# Patient Record
Sex: FEMALE | Race: WHITE | NOT HISPANIC OR LATINO | Employment: FULL TIME | URBAN - METROPOLITAN AREA
[De-identification: names, ages, dates, MRNs, and addresses within clinical notes are randomized per-mention and may not be internally consistent; named-entity substitution may affect disease eponyms.]

---

## 2017-10-26 ENCOUNTER — APPOINTMENT (OUTPATIENT)
Dept: WOMENS IMAGING | Facility: HOSPITAL | Age: 52
End: 2017-10-26

## 2017-10-26 PROCEDURE — 77067 SCR MAMMO BI INCL CAD: CPT | Performed by: RADIOLOGY

## 2017-10-26 PROCEDURE — G0202 SCR MAMMO BI INCL CAD: HCPCS | Performed by: RADIOLOGY

## 2017-10-26 PROCEDURE — 77063 BREAST TOMOSYNTHESIS BI: CPT | Performed by: RADIOLOGY

## 2018-11-15 ENCOUNTER — APPOINTMENT (OUTPATIENT)
Dept: WOMENS IMAGING | Facility: HOSPITAL | Age: 53
End: 2018-11-15

## 2018-11-15 PROCEDURE — 77063 BREAST TOMOSYNTHESIS BI: CPT | Performed by: RADIOLOGY

## 2018-11-15 PROCEDURE — 77067 SCR MAMMO BI INCL CAD: CPT | Performed by: RADIOLOGY

## 2019-10-10 ENCOUNTER — APPOINTMENT (OUTPATIENT)
Dept: WOMENS IMAGING | Facility: HOSPITAL | Age: 54
End: 2019-10-10

## 2019-10-10 PROCEDURE — 77067 SCR MAMMO BI INCL CAD: CPT | Performed by: RADIOLOGY

## 2019-10-10 PROCEDURE — 77063 BREAST TOMOSYNTHESIS BI: CPT | Performed by: RADIOLOGY

## 2019-10-25 ENCOUNTER — APPOINTMENT (OUTPATIENT)
Dept: WOMENS IMAGING | Facility: HOSPITAL | Age: 54
End: 2019-10-25

## 2019-10-25 PROCEDURE — G0279 TOMOSYNTHESIS, MAMMO: HCPCS | Performed by: RADIOLOGY

## 2019-10-25 PROCEDURE — 77061 BREAST TOMOSYNTHESIS UNI: CPT | Performed by: RADIOLOGY

## 2019-10-25 PROCEDURE — 76641 ULTRASOUND BREAST COMPLETE: CPT | Performed by: RADIOLOGY

## 2019-10-25 PROCEDURE — 77065 DX MAMMO INCL CAD UNI: CPT | Performed by: RADIOLOGY

## 2019-11-11 ENCOUNTER — APPOINTMENT (OUTPATIENT)
Dept: WOMENS IMAGING | Facility: HOSPITAL | Age: 54
End: 2019-11-11

## 2019-11-11 PROCEDURE — 76642 ULTRASOUND BREAST LIMITED: CPT | Performed by: RADIOLOGY

## 2019-12-06 ENCOUNTER — OFFICE VISIT (OUTPATIENT)
Dept: FAMILY MEDICINE CLINIC | Facility: CLINIC | Age: 54
End: 2019-12-06

## 2019-12-06 VITALS
WEIGHT: 122.2 LBS | OXYGEN SATURATION: 97 % | TEMPERATURE: 97.9 F | SYSTOLIC BLOOD PRESSURE: 120 MMHG | BODY MASS INDEX: 20.36 KG/M2 | HEIGHT: 65 IN | HEART RATE: 86 BPM | RESPIRATION RATE: 18 BRPM | DIASTOLIC BLOOD PRESSURE: 70 MMHG

## 2019-12-06 DIAGNOSIS — E03.9 HYPOTHYROIDISM, UNSPECIFIED TYPE: Primary | ICD-10-CM

## 2019-12-06 DIAGNOSIS — M62.838 MUSCLE SPASMS OF NECK: ICD-10-CM

## 2019-12-06 LAB
BILIRUB BLD-MCNC: NEGATIVE MG/DL
CLARITY, POC: CLEAR
COLOR UR: YELLOW
GLUCOSE UR STRIP-MCNC: NEGATIVE MG/DL
KETONES UR QL: NEGATIVE
LEUKOCYTE EST, POC: NEGATIVE
NITRITE UR-MCNC: NEGATIVE MG/ML
PH UR: 6 [PH] (ref 5–8)
PROT UR STRIP-MCNC: NEGATIVE MG/DL
RBC # UR STRIP: NEGATIVE /UL
SP GR UR: 1 (ref 1–1.03)
UROBILINOGEN UR QL: NORMAL

## 2019-12-06 PROCEDURE — 99212 OFFICE O/P EST SF 10 MIN: CPT | Performed by: FAMILY MEDICINE

## 2019-12-06 PROCEDURE — 81003 URINALYSIS AUTO W/O SCOPE: CPT | Performed by: FAMILY MEDICINE

## 2019-12-06 RX ORDER — LEVOTHYROXINE AND LIOTHYRONINE 76; 18 UG/1; UG/1
120 TABLET ORAL DAILY
COMMUNITY
End: 2019-12-06

## 2019-12-06 RX ORDER — TIZANIDINE 4 MG/1
4 TABLET ORAL NIGHTLY
Qty: 30 TABLET | Refills: 3 | Status: SHIPPED | OUTPATIENT
Start: 2019-12-06 | End: 2020-07-13 | Stop reason: SDUPTHER

## 2019-12-06 RX ORDER — TIZANIDINE 4 MG/1
1 TABLET ORAL NIGHTLY
COMMUNITY
Start: 2019-11-03 | End: 2019-12-06 | Stop reason: SDUPTHER

## 2019-12-06 RX ORDER — LEVOCETIRIZINE DIHYDROCHLORIDE 5 MG/1
1 TABLET, FILM COATED ORAL DAILY
COMMUNITY
Start: 2019-11-03 | End: 2021-02-12 | Stop reason: SDUPTHER

## 2019-12-06 RX ORDER — LEVOTHYROXINE AND LIOTHYRONINE 38; 9 UG/1; UG/1
60 TABLET ORAL DAILY
COMMUNITY
End: 2020-06-17

## 2019-12-06 RX ORDER — FEXOFENADINE HCL AND PSEUDOEPHEDRINE HCI 180; 240 MG/1; MG/1
1 TABLET, EXTENDED RELEASE ORAL DAILY
COMMUNITY
End: 2020-07-13

## 2019-12-06 RX ORDER — MONTELUKAST SODIUM 10 MG/1
10 TABLET ORAL DAILY
COMMUNITY
End: 2021-02-12 | Stop reason: SDUPTHER

## 2019-12-06 NOTE — PROGRESS NOTES
Coty Garcia is a 54 y.o. female.     Chief Complaint   Patient presents with   • Thyroid Problem     follow up       Patient is doing great, she is due for labs today, she had an abnormal mammogram done recently by her GYN doctor, but the biopsy was normal, she is due to repeat the mammogram with ultrasound in 6 months by her GYN specialist, she needs refill her thyroid medication today, she is not fasting, she denies any chest pain or shortness of breath, she feels great, and declined an influenza immunization           The following portions of the patient's history were reviewed and updated as appropriate: allergies, current medications, past family history, past medical history, past social history, past surgical history and problem list.    Past Medical History:   Diagnosis Date   • Abdominal pain    • Abnormal blood chemistry    • Abnormal Pap smear of cervix    • Acute maxillary sinusitis    • Allergic rhinitis    • Chronic constipation    • Chronic obstructive pulmonary disease (CMS/HCC)    • Constipation    • Fatigue    • Hematuria    • Hypothyroidism    • IBS (irritable bowel syndrome)    • Increased abdominal girth    • Migraine with aura, intractable    • Neck pain    • Perimenopause    • Vitamin D deficiency        History reviewed. No pertinent surgical history.    Family History   Problem Relation Age of Onset   • Diabetes Mother    • Heart disease Mother    • Hypertension Mother    • Cancer Father    • Diabetes Sister    • Diabetes Sister    • Kidney disease Sister        Social History     Socioeconomic History   • Marital status: Unknown     Spouse name: Not on file   • Number of children: Not on file   • Years of education: Not on file   • Highest education level: Not on file   Tobacco Use   • Smoking status: Current Every Day Smoker     Packs/day: 0.50     Types: Cigarettes   Substance and Sexual Activity   • Alcohol use: No     Frequency: Never   • Drug use: No   • Sexual  activity: Yes     Partners: Male     Birth control/protection: None       Review of Systems   Constitutional: Negative.  Negative for fatigue.   HENT: Negative.    Eyes: Negative for blurred vision.   Respiratory: Negative.  Negative for cough, chest tightness and shortness of breath.    Cardiovascular: Negative.  Negative for chest pain, palpitations and leg swelling.   Gastrointestinal: Negative.    Genitourinary: Negative.    Musculoskeletal: Negative.    Skin: Negative.    Neurological: Negative.  Negative for dizziness and light-headedness.   Hematological: Negative.    All other systems reviewed and are negative.      Objective   Vitals:    12/06/19 0905   BP: 120/70   Pulse: 86   Resp: 18   Temp: 97.9 °F (36.6 °C)   SpO2: 97%     Body mass index is 20.58 kg/m².  Physical Exam   Constitutional: She appears well-developed and well-nourished.   HENT:   Head: Normocephalic and atraumatic.   Right Ear: External ear normal.   Left Ear: External ear normal.   Mouth/Throat: Oropharynx is clear and moist.   Cardiovascular: Normal rate, regular rhythm and normal heart sounds. Exam reveals no gallop and no friction rub.   No murmur heard.  Pulmonary/Chest: Effort normal and breath sounds normal. No stridor. No respiratory distress. She has no wheezes. She has no rales.   Musculoskeletal: Normal range of motion.   Neurological: She is alert.   Skin: Skin is warm.   Vitals reviewed.        Assessment/Plan   Lyndsay was seen today for thyroid problem.    Diagnoses and all orders for this visit:    Hypothyroidism, unspecified type  -     CBC & Differential  -     Comprehensive Metabolic Panel  -     POC Urinalysis Dipstick, Automated; Future  -     TSH  -     T3, Free  -     T4  -     T4, free    Muscle spasms of neck  -     tiZANidine (ZANAFLEX) 4 MG tablet; Take 1 tablet by mouth Every Night.

## 2019-12-07 LAB
ALBUMIN SERPL-MCNC: 4.9 G/DL (ref 3.5–5.2)
ALBUMIN/GLOB SERPL: 1.7 G/DL
ALP SERPL-CCNC: 109 U/L (ref 39–117)
ALT SERPL-CCNC: 23 U/L (ref 1–33)
AST SERPL-CCNC: 21 U/L (ref 1–32)
BASOPHILS # BLD AUTO: 0.03 10*3/MM3 (ref 0–0.2)
BASOPHILS NFR BLD AUTO: 0.5 % (ref 0–1.5)
BILIRUB SERPL-MCNC: 0.3 MG/DL (ref 0.2–1.2)
BUN SERPL-MCNC: 10 MG/DL (ref 6–20)
BUN/CREAT SERPL: 13.9 (ref 7–25)
CALCIUM SERPL-MCNC: 10.1 MG/DL (ref 8.6–10.5)
CHLORIDE SERPL-SCNC: 102 MMOL/L (ref 98–107)
CO2 SERPL-SCNC: 32.4 MMOL/L (ref 22–29)
CREAT SERPL-MCNC: 0.72 MG/DL (ref 0.57–1)
EOSINOPHIL # BLD AUTO: 0.02 10*3/MM3 (ref 0–0.4)
EOSINOPHIL NFR BLD AUTO: 0.3 % (ref 0.3–6.2)
ERYTHROCYTE [DISTWIDTH] IN BLOOD BY AUTOMATED COUNT: 12.7 % (ref 12.3–15.4)
GLOBULIN SER CALC-MCNC: 2.9 GM/DL
GLUCOSE SERPL-MCNC: 91 MG/DL (ref 65–99)
HCT VFR BLD AUTO: 44.6 % (ref 34–46.6)
HGB BLD-MCNC: 15 G/DL (ref 12–15.9)
IMM GRANULOCYTES # BLD AUTO: 0.01 10*3/MM3 (ref 0–0.05)
IMM GRANULOCYTES NFR BLD AUTO: 0.2 % (ref 0–0.5)
LYMPHOCYTES # BLD AUTO: 1.8 10*3/MM3 (ref 0.7–3.1)
LYMPHOCYTES NFR BLD AUTO: 30.2 % (ref 19.6–45.3)
MCH RBC QN AUTO: 29.5 PG (ref 26.6–33)
MCHC RBC AUTO-ENTMCNC: 33.6 G/DL (ref 31.5–35.7)
MCV RBC AUTO: 87.6 FL (ref 79–97)
MONOCYTES # BLD AUTO: 0.67 10*3/MM3 (ref 0.1–0.9)
MONOCYTES NFR BLD AUTO: 11.2 % (ref 5–12)
NEUTROPHILS # BLD AUTO: 3.44 10*3/MM3 (ref 1.7–7)
NEUTROPHILS NFR BLD AUTO: 57.6 % (ref 42.7–76)
NRBC BLD AUTO-RTO: 0 /100 WBC (ref 0–0.2)
PLATELET # BLD AUTO: 368 10*3/MM3 (ref 140–450)
POTASSIUM SERPL-SCNC: 5.2 MMOL/L (ref 3.5–5.2)
PROT SERPL-MCNC: 7.8 G/DL (ref 6–8.5)
RBC # BLD AUTO: 5.09 10*6/MM3 (ref 3.77–5.28)
SODIUM SERPL-SCNC: 144 MMOL/L (ref 136–145)
T3FREE SERPL-MCNC: 4.2 PG/ML (ref 2–4.4)
T4 FREE SERPL-MCNC: 0.95 NG/DL (ref 0.93–1.7)
T4 SERPL-MCNC: 6.2 MCG/DL (ref 4.5–11.7)
TSH SERPL DL<=0.005 MIU/L-ACNC: 3.63 UIU/ML (ref 0.27–4.2)
WBC # BLD AUTO: 5.97 10*3/MM3 (ref 3.4–10.8)

## 2020-06-17 ENCOUNTER — TELEPHONE (OUTPATIENT)
Dept: FAMILY MEDICINE CLINIC | Facility: CLINIC | Age: 55
End: 2020-06-17

## 2020-06-17 RX ORDER — LEVOTHYROXINE AND LIOTHYRONINE 38; 9 UG/1; UG/1
60 TABLET ORAL DAILY
Qty: 90 TABLET | Refills: 3 | Status: SHIPPED | OUTPATIENT
Start: 2020-06-17 | End: 2020-07-13 | Stop reason: SDUPTHER

## 2020-06-17 NOTE — TELEPHONE ENCOUNTER
I called pt, no answer. I LMTCB. I also left her a voicemail stating that Dr. Wahl wanted the pt to continue on some form of thyroid medication, so Chari thyroid was sent into her pharmacy. I stated it was the same dosage as the former medication. 60mg daily. That if she had any questions to call our office.

## 2020-06-17 NOTE — TELEPHONE ENCOUNTER
Patient calling about her Thyroid 60 MG PO tablet. Patient states there was a recall on it.    Please call and advise at [phone].

## 2020-06-17 NOTE — TELEPHONE ENCOUNTER
I spoke to this pt, she stated she already has an appt the first week of July. Which is only about 2 weeks away. She stated she was going to discontinue taking the medication now and come to her visit in July.

## 2020-06-17 NOTE — TELEPHONE ENCOUNTER
I spoke to the pharmacist at her pharmacy on file. She stated that [place] thyroid is a very close substitute for the recalled [place]a NP thyroid. The pharmacist ran the script for the [place] thyroid and it was approved by the pt's insurance and the medication is in stock for the 60mg dosage. Do you wish to substitute the [place] for the recalled Acella? If so, the pharmacist just needs you to send in a new script into the pharmacy on file.

## 2020-06-19 ENCOUNTER — TELEPHONE (OUTPATIENT)
Dept: FAMILY MEDICINE CLINIC | Facility: CLINIC | Age: 55
End: 2020-06-19

## 2020-06-19 DIAGNOSIS — E03.9 HYPOTHYROIDISM, UNSPECIFIED TYPE: Primary | ICD-10-CM

## 2020-06-19 NOTE — TELEPHONE ENCOUNTER
Patient states she was returning your call from yesterday.  Phone number is [phone].  I told her the last message in her chart about the thyroid medication change, but she said she got that message and then she got another call yesterday saying you really needed to talk to her.  I didn't see anything else in the chart.

## 2020-06-19 NOTE — TELEPHONE ENCOUNTER
I looked through patient chart and did not see any other messages besides the latest message. I told her you would be in the office on Monday. She understood and will be looking for your call monday

## 2020-06-22 ENCOUNTER — TELEPHONE (OUTPATIENT)
Dept: FAMILY MEDICINE CLINIC | Facility: CLINIC | Age: 55
End: 2020-06-22

## 2020-06-22 NOTE — TELEPHONE ENCOUNTER
I tried calling the pt to make sure she is taking the [place] thyroid medication since her thyroid medication she was taking was recalled. Dr. Wahl wanted me to check to make sure she is picked up the meds and is taking it and not doing with out. Pt did not answer.

## 2020-07-13 ENCOUNTER — OFFICE VISIT (OUTPATIENT)
Dept: FAMILY MEDICINE CLINIC | Facility: CLINIC | Age: 55
End: 2020-07-13

## 2020-07-13 ENCOUNTER — APPOINTMENT (OUTPATIENT)
Dept: WOMENS IMAGING | Facility: HOSPITAL | Age: 55
End: 2020-07-13

## 2020-07-13 VITALS
OXYGEN SATURATION: 96 % | SYSTOLIC BLOOD PRESSURE: 110 MMHG | RESPIRATION RATE: 16 BRPM | WEIGHT: 120.4 LBS | HEART RATE: 79 BPM | HEIGHT: 65 IN | TEMPERATURE: 97.5 F | DIASTOLIC BLOOD PRESSURE: 78 MMHG | BODY MASS INDEX: 20.06 KG/M2

## 2020-07-13 DIAGNOSIS — M62.838 MUSCLE SPASMS OF NECK: ICD-10-CM

## 2020-07-13 DIAGNOSIS — E03.9 HYPOTHYROIDISM, UNSPECIFIED TYPE: Primary | ICD-10-CM

## 2020-07-13 LAB
BILIRUB BLD-MCNC: NEGATIVE MG/DL
CLARITY, POC: CLEAR
COLOR UR: NORMAL
GLUCOSE UR STRIP-MCNC: NEGATIVE MG/DL
KETONES UR QL: NEGATIVE
LEUKOCYTE EST, POC: NEGATIVE
NITRITE UR-MCNC: NEGATIVE MG/ML
PH UR: 6 [PH] (ref 5–8)
PROT UR STRIP-MCNC: NEGATIVE MG/DL
RBC # UR STRIP: NEGATIVE /UL
SP GR UR: 1.02 (ref 1–1.03)
UROBILINOGEN UR QL: NORMAL

## 2020-07-13 PROCEDURE — 76641 ULTRASOUND BREAST COMPLETE: CPT | Performed by: RADIOLOGY

## 2020-07-13 PROCEDURE — 99213 OFFICE O/P EST LOW 20 MIN: CPT | Performed by: FAMILY MEDICINE

## 2020-07-13 PROCEDURE — 77061 BREAST TOMOSYNTHESIS UNI: CPT | Performed by: RADIOLOGY

## 2020-07-13 PROCEDURE — G0279 TOMOSYNTHESIS, MAMMO: HCPCS | Performed by: RADIOLOGY

## 2020-07-13 PROCEDURE — 77065 DX MAMMO INCL CAD UNI: CPT | Performed by: RADIOLOGY

## 2020-07-13 PROCEDURE — 81003 URINALYSIS AUTO W/O SCOPE: CPT | Performed by: FAMILY MEDICINE

## 2020-07-13 RX ORDER — LEVOTHYROXINE AND LIOTHYRONINE 38; 9 UG/1; UG/1
60 TABLET ORAL DAILY
Qty: 90 TABLET | Refills: 3 | Status: SHIPPED | OUTPATIENT
Start: 2020-07-13 | End: 2021-07-26 | Stop reason: SDUPTHER

## 2020-07-13 RX ORDER — FEXOFENADINE HCL AND PSEUDOEPHEDRINE HCI 60; 120 MG/1; MG/1
1 TABLET, EXTENDED RELEASE ORAL 2 TIMES DAILY
COMMUNITY
End: 2021-02-12 | Stop reason: ALTCHOICE

## 2020-07-13 RX ORDER — TIZANIDINE 4 MG/1
4 TABLET ORAL NIGHTLY PRN
Qty: 30 TABLET | Refills: 3 | Status: SHIPPED | OUTPATIENT
Start: 2020-07-13 | End: 2021-08-13 | Stop reason: SDUPTHER

## 2020-07-13 NOTE — PROGRESS NOTES
Subjective   Lyndsay Garcia is a 55 y.o. female.     Chief Complaint   Patient presents with   • Hypothyroidism       Hypothyroidism   This is a chronic problem. The current episode started more than 1 year ago. The problem has been unchanged. Nothing aggravates the symptoms.   Patient was taking NP thyroid, have been recalled, now taking [place] Thyroid 60, since last week, she is fasting, for some unknown reason she taking Allegra-D and Xyzal, recommend to use only one or the other not both, no chest pain or shortness of breath, always feeling okay, she has shingles recently, would like to get the shingles vaccine, complaining of strong odor of the urine, no dysuria      The following portions of the patient's history were reviewed and updated as appropriate: allergies, current medications, past family history, past medical history, past social history, past surgical history and problem list.    Past Medical History:   Diagnosis Date   • Abdominal pain    • Abnormal blood chemistry    • Abnormal Pap smear of cervix    • Acute maxillary sinusitis    • Allergic rhinitis    • Chronic constipation    • Chronic obstructive pulmonary disease (CMS/HCC)    • Constipation    • Fatigue    • Hematuria    • Hypothyroidism    • IBS (irritable bowel syndrome)    • Increased abdominal girth    • Migraine with aura, intractable    • Neck pain    • Perimenopause    • Vitamin D deficiency        History reviewed. No pertinent surgical history.    Family History   Problem Relation Age of Onset   • Diabetes Mother    • Heart disease Mother    • Hypertension Mother    • Cancer Father    • Diabetes Sister    • Diabetes Sister    • Kidney disease Sister        Social History     Socioeconomic History   • Marital status: Unknown     Spouse name: Not on file   • Number of children: Not on file   • Years of education: Not on file   • Highest education level: Not on file   Tobacco Use   • Smoking status: Current Every Day Smoker      Packs/day: 0.50     Types: Cigarettes   • Smokeless tobacco: Never Used   Substance and Sexual Activity   • Alcohol use: No     Frequency: Never   • Drug use: No   • Sexual activity: Yes     Partners: Male     Birth control/protection: None       Review of Systems   Constitutional: Negative.    HENT: Negative.    Respiratory: Negative.    Cardiovascular: Negative.    Gastrointestinal: Negative.    Genitourinary: Negative.    Musculoskeletal: Negative.    Skin: Negative.    Neurological: Negative.    Hematological: Negative.    Psychiatric/Behavioral: Negative.        Objective   Vitals:    07/13/20 0809   BP: 110/78   Pulse: 79   Resp: 16   Temp: 97.5 °F (36.4 °C)   SpO2: 96%     Body mass index is 20.28 kg/m².  Physical Exam   Constitutional: She is oriented to person, place, and time. She appears well-developed and well-nourished.   HENT:   Head: Normocephalic and atraumatic.   Right Ear: External ear normal.   Left Ear: External ear normal.   Nose: Nose normal.   Mouth/Throat: Oropharynx is clear and moist.   Eyes: Pupils are equal, round, and reactive to light. Conjunctivae and EOM are normal.   Neck: Normal range of motion. Neck supple. No tracheal deviation present. No thyromegaly present.   Cardiovascular: Normal rate, regular rhythm and normal heart sounds. Exam reveals no gallop and no friction rub.   No murmur heard.  Pulmonary/Chest: Effort normal and breath sounds normal. No respiratory distress. She exhibits no tenderness.   Abdominal: Soft. Bowel sounds are normal. She exhibits no distension. There is no tenderness.   Musculoskeletal: Normal range of motion. She exhibits no edema or tenderness.   Lymphadenopathy:     She has no cervical adenopathy.   Neurological: She is alert and oriented to person, place, and time. She displays normal reflexes. No cranial nerve deficit or sensory deficit. Coordination normal.   Skin: Skin is warm and dry.   Psychiatric: She has a normal mood and affect. Her behavior  is normal. Judgment and thought content normal.   Nursing note and vitals reviewed.        Assessment/Plan   Lyndsay was seen today for hypothyroidism.    Diagnoses and all orders for this visit:    Hypothyroidism, unspecified type  -     CBC & Differential  -     Comprehensive Metabolic Panel  -     Lipid Panel  -     TSH  -     POC Urinalysis Dipstick, Automated  -     T3, Free  -     T4  -     T4, free

## 2020-07-14 DIAGNOSIS — E03.9 HYPOTHYROIDISM, UNSPECIFIED TYPE: ICD-10-CM

## 2020-07-14 DIAGNOSIS — E87.5 HYPERKALEMIA: Primary | ICD-10-CM

## 2020-07-14 LAB
ALBUMIN SERPL-MCNC: 4.6 G/DL (ref 3.5–5.2)
ALBUMIN/GLOB SERPL: 1.6 G/DL
ALP SERPL-CCNC: 114 U/L (ref 39–117)
ALT SERPL-CCNC: 16 U/L (ref 1–33)
AST SERPL-CCNC: 15 U/L (ref 1–32)
BASOPHILS # BLD AUTO: 0.04 10*3/MM3 (ref 0–0.2)
BASOPHILS NFR BLD AUTO: 0.5 % (ref 0–1.5)
BILIRUB SERPL-MCNC: 0.4 MG/DL (ref 0–1.2)
BUN SERPL-MCNC: 10 MG/DL (ref 6–20)
BUN/CREAT SERPL: 15.2 (ref 7–25)
CALCIUM SERPL-MCNC: 9.7 MG/DL (ref 8.6–10.5)
CHLORIDE SERPL-SCNC: 100 MMOL/L (ref 98–107)
CHOLEST SERPL-MCNC: 143 MG/DL (ref 0–200)
CO2 SERPL-SCNC: 27.8 MMOL/L (ref 22–29)
CREAT SERPL-MCNC: 0.66 MG/DL (ref 0.57–1)
EOSINOPHIL # BLD AUTO: 0.06 10*3/MM3 (ref 0–0.4)
EOSINOPHIL NFR BLD AUTO: 0.8 % (ref 0.3–6.2)
ERYTHROCYTE [DISTWIDTH] IN BLOOD BY AUTOMATED COUNT: 13.2 % (ref 12.3–15.4)
GLOBULIN SER CALC-MCNC: 2.9 GM/DL
GLUCOSE SERPL-MCNC: 115 MG/DL (ref 65–99)
HCT VFR BLD AUTO: 45.9 % (ref 34–46.6)
HDLC SERPL-MCNC: 35 MG/DL (ref 40–60)
HGB BLD-MCNC: 15.6 G/DL (ref 12–15.9)
IMM GRANULOCYTES # BLD AUTO: 0.01 10*3/MM3 (ref 0–0.05)
IMM GRANULOCYTES NFR BLD AUTO: 0.1 % (ref 0–0.5)
LDLC SERPL CALC-MCNC: 71 MG/DL (ref 0–100)
LYMPHOCYTES # BLD AUTO: 1.88 10*3/MM3 (ref 0.7–3.1)
LYMPHOCYTES NFR BLD AUTO: 25.1 % (ref 19.6–45.3)
MCH RBC QN AUTO: 30.1 PG (ref 26.6–33)
MCHC RBC AUTO-ENTMCNC: 34 G/DL (ref 31.5–35.7)
MCV RBC AUTO: 88.6 FL (ref 79–97)
MONOCYTES # BLD AUTO: 0.75 10*3/MM3 (ref 0.1–0.9)
MONOCYTES NFR BLD AUTO: 10 % (ref 5–12)
NEUTROPHILS # BLD AUTO: 4.76 10*3/MM3 (ref 1.7–7)
NEUTROPHILS NFR BLD AUTO: 63.5 % (ref 42.7–76)
NRBC BLD AUTO-RTO: 0 /100 WBC (ref 0–0.2)
PLATELET # BLD AUTO: 352 10*3/MM3 (ref 140–450)
POTASSIUM SERPL-SCNC: 5.3 MMOL/L (ref 3.5–5.2)
PROT SERPL-MCNC: 7.5 G/DL (ref 6–8.5)
RBC # BLD AUTO: 5.18 10*6/MM3 (ref 3.77–5.28)
SODIUM SERPL-SCNC: 139 MMOL/L (ref 136–145)
T3FREE SERPL-MCNC: 4.1 PG/ML (ref 2–4.4)
T4 FREE SERPL-MCNC: 0.82 NG/DL (ref 0.93–1.7)
T4 SERPL-MCNC: 5.88 MCG/DL (ref 4.5–11.7)
TRIGL SERPL-MCNC: 184 MG/DL (ref 0–150)
TSH SERPL DL<=0.005 MIU/L-ACNC: 8.25 UIU/ML (ref 0.27–4.2)
VLDLC SERPL CALC-MCNC: 36.8 MG/DL
WBC # BLD AUTO: 7.5 10*3/MM3 (ref 3.4–10.8)

## 2020-07-24 ENCOUNTER — RESULTS ENCOUNTER (OUTPATIENT)
Dept: FAMILY MEDICINE CLINIC | Facility: CLINIC | Age: 55
End: 2020-07-24

## 2020-07-24 DIAGNOSIS — E87.5 HYPERKALEMIA: ICD-10-CM

## 2020-08-13 DIAGNOSIS — E03.9 HYPOTHYROIDISM, UNSPECIFIED TYPE: ICD-10-CM

## 2020-08-13 DIAGNOSIS — R30.0 DYSURIA: Primary | ICD-10-CM

## 2020-08-14 ENCOUNTER — TELEPHONE (OUTPATIENT)
Dept: FAMILY MEDICINE CLINIC | Facility: CLINIC | Age: 55
End: 2020-08-14

## 2020-08-14 LAB
T3FREE SERPL-MCNC: 3.2 PG/ML (ref 2–4.4)
T4 FREE SERPL-MCNC: 0.9 NG/DL (ref 0.93–1.7)
T4 SERPL-MCNC: 6.31 MCG/DL (ref 4.5–11.7)
TSH SERPL DL<=0.005 MIU/L-ACNC: 7.85 UIU/ML (ref 0.27–4.2)

## 2020-08-14 NOTE — TELEPHONE ENCOUNTER
Discussed with patient thyroid labs results, she told me she was taking only the thyroid medication 5 times a week, she would prefer to take it now every day of the week, and recheck in 1 month

## 2020-08-19 NOTE — TELEPHONE ENCOUNTER
I can not see where it had been done. I know the pt left a specimen but I'm not certain this test was completed. I spoke to the pt and she states she is still having burning but is able to get into our office for another sample at this time. She was hoping we could call her in something and test again after completing what ever you may call in?

## 2020-08-20 DIAGNOSIS — R30.0 DYSURIA: Primary | ICD-10-CM

## 2020-08-20 RX ORDER — CEFDINIR 300 MG/1
300 CAPSULE ORAL 2 TIMES DAILY
Qty: 20 CAPSULE | Refills: 0 | Status: SHIPPED | OUTPATIENT
Start: 2020-08-20 | End: 2020-08-30

## 2020-09-25 DIAGNOSIS — E55.9 VITAMIN D DEFICIENCY: ICD-10-CM

## 2020-09-25 DIAGNOSIS — E03.9 HYPOTHYROIDISM, UNSPECIFIED TYPE: Primary | ICD-10-CM

## 2020-09-26 LAB
25(OH)D3+25(OH)D2 SERPL-MCNC: 80 NG/ML (ref 30–100)
T3FREE SERPL-MCNC: 4.6 PG/ML (ref 2–4.4)
T4 FREE SERPL-MCNC: 1.05 NG/DL (ref 0.93–1.7)
TSH SERPL DL<=0.005 MIU/L-ACNC: 1.68 UIU/ML (ref 0.27–4.2)

## 2020-10-05 ENCOUNTER — TELEPHONE (OUTPATIENT)
Dept: FAMILY MEDICINE CLINIC | Facility: CLINIC | Age: 55
End: 2020-10-05

## 2020-10-05 DIAGNOSIS — E03.9 HYPOTHYROIDISM, UNSPECIFIED TYPE: Primary | ICD-10-CM

## 2020-10-05 NOTE — TELEPHONE ENCOUNTER
Patient would like to speak with Dr Wahl directly about her lab results which she had a week ago on Friday, this is the first time she has called & again she wants to speak with Dr Wahl directly not his assistant

## 2020-11-30 ENCOUNTER — RESULTS ENCOUNTER (OUTPATIENT)
Dept: FAMILY MEDICINE CLINIC | Facility: CLINIC | Age: 55
End: 2020-11-30

## 2020-11-30 DIAGNOSIS — E03.9 HYPOTHYROIDISM, UNSPECIFIED TYPE: ICD-10-CM

## 2021-02-12 ENCOUNTER — OFFICE VISIT (OUTPATIENT)
Dept: FAMILY MEDICINE CLINIC | Facility: CLINIC | Age: 56
End: 2021-02-12

## 2021-02-12 VITALS
WEIGHT: 121 LBS | OXYGEN SATURATION: 94 % | HEART RATE: 78 BPM | BODY MASS INDEX: 20.16 KG/M2 | SYSTOLIC BLOOD PRESSURE: 90 MMHG | TEMPERATURE: 97.6 F | DIASTOLIC BLOOD PRESSURE: 80 MMHG | HEIGHT: 65 IN

## 2021-02-12 DIAGNOSIS — E55.9 VITAMIN D DEFICIENCY: ICD-10-CM

## 2021-02-12 DIAGNOSIS — E03.9 HYPOTHYROIDISM, UNSPECIFIED TYPE: ICD-10-CM

## 2021-02-12 DIAGNOSIS — T78.40XS ALLERGY, SEQUELA: ICD-10-CM

## 2021-02-12 DIAGNOSIS — Z00.00 WELLNESS EXAMINATION: Primary | ICD-10-CM

## 2021-02-12 DIAGNOSIS — F17.200 SMOKER: ICD-10-CM

## 2021-02-12 PROBLEM — T78.40XA ALLERGIES: Status: ACTIVE | Noted: 2021-02-12

## 2021-02-12 PROCEDURE — 99213 OFFICE O/P EST LOW 20 MIN: CPT | Performed by: FAMILY MEDICINE

## 2021-02-12 PROCEDURE — 81003 URINALYSIS AUTO W/O SCOPE: CPT | Performed by: FAMILY MEDICINE

## 2021-02-12 PROCEDURE — 99396 PREV VISIT EST AGE 40-64: CPT | Performed by: FAMILY MEDICINE

## 2021-02-12 RX ORDER — MONTELUKAST SODIUM 10 MG/1
10 TABLET ORAL NIGHTLY
Qty: 90 TABLET | Refills: 3 | Status: SHIPPED | OUTPATIENT
Start: 2021-02-12 | End: 2022-04-25

## 2021-02-12 RX ORDER — LANOLIN ALCOHOL/MO/W.PET/CERES
50 CREAM (GRAM) TOPICAL DAILY
COMMUNITY
End: 2021-08-13

## 2021-02-12 RX ORDER — LEVOCETIRIZINE DIHYDROCHLORIDE 5 MG/1
5 TABLET, FILM COATED ORAL EVERY EVENING
Qty: 90 TABLET | Refills: 3 | Status: SHIPPED | OUTPATIENT
Start: 2021-02-12 | End: 2021-08-13

## 2021-02-12 NOTE — PROGRESS NOTES
"Chief Complaint  Hypothyroidism    Subjective          Lyndsay Garcia presents to [facility]  History of Present Illness  MY done by GYN and Pap , last one 2019, last pap 2020 normal, Cologuard 2 years ago normal, fasting, needs refills for allergy medication, his allergies diet recently, no chest or shortness of breath, patient is a smoker, no abdominal pain, no complaints,  Review of Systems   Constitutional: Negative.    HENT: Negative.    Eyes: Negative.    Respiratory: Negative.    Cardiovascular: Negative.    Gastrointestinal: Negative.    Endocrine: Negative.    Genitourinary: Negative.    Musculoskeletal: Negative.    Skin: Negative.    Allergic/Immunologic: Negative.    Neurological: Negative.    Hematological: Negative.    Psychiatric/Behavioral: Negative.    All other systems reviewed and are negative.    Objective   Vital Signs:   BP 90/80 (BP Location: Right arm, Patient Position: Sitting)   Pulse 78   Temp 97.6 °F (36.4 °C) (Temporal)   Ht 164.1 cm (64.61\")   Wt 54.9 kg (121 lb)   SpO2 94%   BMI 20.38 kg/m²     Physical Exam  Vitals signs and nursing note reviewed.   Constitutional:       Appearance: Normal appearance. She is well-developed and normal weight.   HENT:      Head: Normocephalic and atraumatic.      Right Ear: Tympanic membrane, ear canal and external ear normal.      Left Ear: Tympanic membrane, ear canal and external ear normal.      Nose: Nose normal.   Eyes:      General: No scleral icterus.        Right eye: No discharge.         Left eye: No discharge.      Conjunctiva/sclera: Conjunctivae normal.      Pupils: Pupils are equal, round, and reactive to light.   Neck:      Musculoskeletal: Normal range of motion and neck supple.      Thyroid: No thyromegaly.      Vascular: No JVD.      Trachea: No tracheal deviation.   Cardiovascular:      Rate and Rhythm: Normal rate and regular rhythm.      Heart sounds: Normal heart sounds. No murmur. No friction " rub. No gallop.    Pulmonary:      Effort: Pulmonary effort is normal. No respiratory distress.      Breath sounds: Normal breath sounds. No wheezing or rales.   Chest:      Chest wall: No tenderness.   Abdominal:      General: Bowel sounds are normal. There is no distension.      Palpations: Abdomen is soft. There is no mass.      Tenderness: There is no abdominal tenderness. There is no guarding or rebound.      Hernia: No hernia is present.   Musculoskeletal: Normal range of motion.         General: No tenderness.   Lymphadenopathy:      Cervical: No cervical adenopathy.   Skin:     General: Skin is warm and dry.   Neurological:      General: No focal deficit present.      Mental Status: She is alert.      Cranial Nerves: No cranial nerve deficit.      Sensory: No sensory deficit.      Motor: No abnormal muscle tone.      Coordination: Coordination normal.      Deep Tendon Reflexes: Reflexes normal.   Psychiatric:         Mood and Affect: Mood normal.         Behavior: Behavior normal.         Thought Content: Thought content normal.         Judgment: Judgment normal.        Result Review :                 Assessment and Plan    Diagnoses and all orders for this visit:    1. Wellness examination (Primary)  -     CBC & Differential  -     Comprehensive Metabolic Panel  -     Lipid Panel  -     TSH  -     POC Urinalysis Dipstick, Automated  -     T3, Free  -     T4, Free  -     Hepatitis C antibody    2. Hypothyroidism, unspecified type  -     Cancel: CBC & Differential  -     Cancel: Comprehensive Metabolic Panel  -     Cancel: Lipid Panel  -     Cancel: TSH  -     Cancel: POC Urinalysis Dipstick, Automated  -     Cancel: T3, Free  -     Cancel: T4, Free  -     CBC & Differential  -     Comprehensive Metabolic Panel  -     Lipid Panel  -     TSH  -     POC Urinalysis Dipstick, Automated  -     T3, Free  -     T4, Free  -     Hepatitis C antibody    3. Vitamin D deficiency  -     Cancel: CBC & Differential  -      Cancel: Comprehensive Metabolic Panel  -     Cancel: Lipid Panel  -     Cancel: TSH  -     Cancel: POC Urinalysis Dipstick, Automated  -     Cancel: T3, Free  -     Cancel: T4, Free  -     Vitamin D 25 hydroxy  -     CBC & Differential  -     Comprehensive Metabolic Panel  -     Lipid Panel  -     TSH  -     POC Urinalysis Dipstick, Automated  -     T3, Free  -     T4, Free  -     Hepatitis C antibody    4. Allergy, sequela  -     levocetirizine (XYZAL) 5 MG tablet; Take 1 tablet by mouth Every Evening.  Dispense: 90 tablet; Refill: 3  -     montelukast (SINGULAIR) 10 MG tablet; Take 1 tablet by mouth Every Night.  Dispense: 90 tablet; Refill: 3    5. Smoker        Follow Up   Return in about 6 months (around 8/12/2021).  Patient was given instructions and counseling regarding her condition or for health maintenance advice. Please see specific information pulled into the AVS if appropriate.   Quit smoking advised

## 2021-02-13 DIAGNOSIS — E78.1 HIGH TRIGLYCERIDES: Primary | ICD-10-CM

## 2021-02-13 LAB
ALBUMIN SERPL-MCNC: 5 G/DL (ref 3.5–5.2)
ALBUMIN/GLOB SERPL: 1.9 G/DL
ALP SERPL-CCNC: 138 U/L (ref 39–117)
ALT SERPL-CCNC: 17 U/L (ref 1–33)
AST SERPL-CCNC: 20 U/L (ref 1–32)
BASOPHILS # BLD AUTO: 0.05 10*3/MM3 (ref 0–0.2)
BASOPHILS NFR BLD AUTO: 0.6 % (ref 0–1.5)
BILIRUB SERPL-MCNC: 0.3 MG/DL (ref 0–1.2)
BUN SERPL-MCNC: 14 MG/DL (ref 6–20)
BUN/CREAT SERPL: 18.9 (ref 7–25)
CALCIUM SERPL-MCNC: 10.2 MG/DL (ref 8.6–10.5)
CHLORIDE SERPL-SCNC: 97 MMOL/L (ref 98–107)
CHOLEST SERPL-MCNC: 176 MG/DL (ref 0–200)
CO2 SERPL-SCNC: 29.9 MMOL/L (ref 22–29)
CREAT SERPL-MCNC: 0.74 MG/DL (ref 0.57–1)
EOSINOPHIL # BLD AUTO: 0.08 10*3/MM3 (ref 0–0.4)
EOSINOPHIL NFR BLD AUTO: 1 % (ref 0.3–6.2)
ERYTHROCYTE [DISTWIDTH] IN BLOOD BY AUTOMATED COUNT: 13 % (ref 12.3–15.4)
GLOBULIN SER CALC-MCNC: 2.6 GM/DL
GLUCOSE SERPL-MCNC: 120 MG/DL (ref 65–99)
HCT VFR BLD AUTO: 49.5 % (ref 34–46.6)
HCV AB S/CO SERPL IA: <0.1 S/CO RATIO (ref 0–0.9)
HDLC SERPL-MCNC: 48 MG/DL (ref 40–60)
HGB BLD-MCNC: 16.5 G/DL (ref 12–15.9)
IMM GRANULOCYTES # BLD AUTO: 0.02 10*3/MM3 (ref 0–0.05)
IMM GRANULOCYTES NFR BLD AUTO: 0.3 % (ref 0–0.5)
LDLC SERPL CALC-MCNC: 99 MG/DL (ref 0–100)
LYMPHOCYTES # BLD AUTO: 2.26 10*3/MM3 (ref 0.7–3.1)
LYMPHOCYTES NFR BLD AUTO: 29 % (ref 19.6–45.3)
MCH RBC QN AUTO: 29 PG (ref 26.6–33)
MCHC RBC AUTO-ENTMCNC: 33.3 G/DL (ref 31.5–35.7)
MCV RBC AUTO: 87 FL (ref 79–97)
MONOCYTES # BLD AUTO: 0.67 10*3/MM3 (ref 0.1–0.9)
MONOCYTES NFR BLD AUTO: 8.6 % (ref 5–12)
NEUTROPHILS # BLD AUTO: 4.7 10*3/MM3 (ref 1.7–7)
NEUTROPHILS NFR BLD AUTO: 60.5 % (ref 42.7–76)
NRBC BLD AUTO-RTO: 0 /100 WBC (ref 0–0.2)
PLATELET # BLD AUTO: 438 10*3/MM3 (ref 140–450)
POTASSIUM SERPL-SCNC: 5 MMOL/L (ref 3.5–5.2)
PROT SERPL-MCNC: 7.6 G/DL (ref 6–8.5)
RBC # BLD AUTO: 5.69 10*6/MM3 (ref 3.77–5.28)
SODIUM SERPL-SCNC: 140 MMOL/L (ref 136–145)
T3FREE SERPL-MCNC: 4.5 PG/ML (ref 2–4.4)
T4 FREE SERPL-MCNC: 1.03 NG/DL (ref 0.93–1.7)
TRIGL SERPL-MCNC: 167 MG/DL (ref 0–150)
TSH SERPL DL<=0.005 MIU/L-ACNC: 3.06 UIU/ML (ref 0.27–4.2)
VLDLC SERPL CALC-MCNC: 29 MG/DL (ref 5–40)
WBC # BLD AUTO: 7.78 10*3/MM3 (ref 3.4–10.8)

## 2021-02-13 RX ORDER — ATORVASTATIN CALCIUM 20 MG/1
20 TABLET, FILM COATED ORAL NIGHTLY
Qty: 90 TABLET | Refills: 1 | Status: SHIPPED | OUTPATIENT
Start: 2021-02-13 | End: 2021-02-13 | Stop reason: ALTCHOICE

## 2021-03-24 ENCOUNTER — BULK ORDERING (OUTPATIENT)
Dept: CASE MANAGEMENT | Facility: OTHER | Age: 56
End: 2021-03-24

## 2021-03-24 DIAGNOSIS — Z23 IMMUNIZATION DUE: ICD-10-CM

## 2021-07-26 RX ORDER — LEVOTHYROXINE AND LIOTHYRONINE 38; 9 UG/1; UG/1
60 TABLET ORAL DAILY
Qty: 90 TABLET | Refills: 3 | Status: SHIPPED | OUTPATIENT
Start: 2021-07-26 | End: 2021-07-26 | Stop reason: SDUPTHER

## 2021-07-26 RX ORDER — LEVOTHYROXINE AND LIOTHYRONINE 38; 9 UG/1; UG/1
60 TABLET ORAL DAILY
Qty: 90 TABLET | Refills: 3 | Status: SHIPPED | OUTPATIENT
Start: 2021-07-26 | End: 2022-02-07 | Stop reason: SDUPTHER

## 2021-07-26 NOTE — TELEPHONE ENCOUNTER
Caller: Lyndsay Garcia    Relationship: Self    Best call back number: [phone]    Medication needed:   Requested Prescriptions     Pending Prescriptions Disp Refills   • Thyroid (ARMOUR THYROID) 60 MG PO tablet 90 tablet 3     Sig: Take 1 tablet by mouth Daily.       When do you need the refill by: ASAP    What additional details did the patient provide when requesting the medication: PATIENT HAS TWO PILLS LEFT.  SHE HAS AN APPOINTMENT SCHEDULED ON 8/13/2021    Does the patient have less than a 3 day supply:  [x] Yes  [] No    What is the patient's preferred pharmacy: [facility] [address] [facility] [address] & [address] - [phone]  - [phone] FX            No complaints

## 2021-08-13 ENCOUNTER — OFFICE VISIT (OUTPATIENT)
Dept: FAMILY MEDICINE CLINIC | Facility: CLINIC | Age: 56
End: 2021-08-13

## 2021-08-13 VITALS
HEIGHT: 64 IN | BODY MASS INDEX: 20.66 KG/M2 | HEART RATE: 74 BPM | TEMPERATURE: 97.7 F | OXYGEN SATURATION: 93 % | WEIGHT: 121 LBS | SYSTOLIC BLOOD PRESSURE: 120 MMHG | DIASTOLIC BLOOD PRESSURE: 74 MMHG

## 2021-08-13 DIAGNOSIS — F17.200 SMOKER: ICD-10-CM

## 2021-08-13 DIAGNOSIS — R06.2 WHEEZING: ICD-10-CM

## 2021-08-13 DIAGNOSIS — E03.9 HYPOTHYROIDISM, UNSPECIFIED TYPE: ICD-10-CM

## 2021-08-13 DIAGNOSIS — J40 BRONCHITIS: Primary | ICD-10-CM

## 2021-08-13 DIAGNOSIS — R05.9 COUGH: ICD-10-CM

## 2021-08-13 DIAGNOSIS — B35.9 TINEA: ICD-10-CM

## 2021-08-13 DIAGNOSIS — E55.9 VITAMIN D DEFICIENCY: ICD-10-CM

## 2021-08-13 DIAGNOSIS — M62.838 MUSCLE SPASMS OF NECK: ICD-10-CM

## 2021-08-13 DIAGNOSIS — J44.1 COPD WITH EXACERBATION (HCC): ICD-10-CM

## 2021-08-13 DIAGNOSIS — E78.1 HIGH TRIGLYCERIDES: ICD-10-CM

## 2021-08-13 PROCEDURE — 96372 THER/PROPH/DIAG INJ SC/IM: CPT | Performed by: FAMILY MEDICINE

## 2021-08-13 PROCEDURE — 99214 OFFICE O/P EST MOD 30 MIN: CPT | Performed by: FAMILY MEDICINE

## 2021-08-13 RX ORDER — DOXYCYCLINE HYCLATE 100 MG
100 TABLET ORAL 2 TIMES DAILY
Qty: 20 TABLET | Refills: 0 | Status: SHIPPED | OUTPATIENT
Start: 2021-08-13 | End: 2021-08-23

## 2021-08-13 RX ORDER — ALBUTEROL SULFATE 90 UG/1
2 AEROSOL, METERED RESPIRATORY (INHALATION) EVERY 6 HOURS PRN
Qty: 18 G | Refills: 3 | Status: SHIPPED | OUTPATIENT
Start: 2021-08-13 | End: 2022-02-09

## 2021-08-13 RX ORDER — TIZANIDINE 4 MG/1
4 TABLET ORAL NIGHTLY PRN
Qty: 30 TABLET | Refills: 3 | Status: SHIPPED | OUTPATIENT
Start: 2021-08-13 | End: 2022-07-05

## 2021-08-13 RX ORDER — ANTIARTHRITIC COMBINATION NO.2 900 MG
TABLET ORAL DAILY
COMMUNITY
End: 2022-08-08

## 2021-08-13 RX ORDER — METHYLPREDNISOLONE 4 MG/1
TABLET ORAL
Qty: 21 EACH | Refills: 0 | Status: SHIPPED | OUTPATIENT
Start: 2021-08-13 | End: 2022-02-07

## 2021-08-13 RX ORDER — METHYLPREDNISOLONE SODIUM SUCCINATE 40 MG/ML
40 INJECTION, POWDER, LYOPHILIZED, FOR SOLUTION INTRAMUSCULAR; INTRAVENOUS ONCE
Status: COMPLETED | OUTPATIENT
Start: 2021-08-13 | End: 2021-08-13

## 2021-08-13 RX ORDER — TIZANIDINE 4 MG/1
4 TABLET ORAL NIGHTLY PRN
Qty: 30 TABLET | Refills: 3 | Status: SHIPPED | OUTPATIENT
Start: 2021-08-13 | End: 2021-08-13

## 2021-08-13 RX ORDER — BROMPHENIRAMINE MALEATE, PSEUDOEPHEDRINE HYDROCHLORIDE, AND DEXTROMETHORPHAN HYDROBROMIDE 2; 30; 10 MG/5ML; MG/5ML; MG/5ML
10 SYRUP ORAL 4 TIMES DAILY PRN
Qty: 118 ML | Refills: 1 | Status: SHIPPED | OUTPATIENT
Start: 2021-08-13 | End: 2022-02-07

## 2021-08-13 RX ADMIN — METHYLPREDNISOLONE SODIUM SUCCINATE 40 MG: 40 INJECTION, POWDER, LYOPHILIZED, FOR SOLUTION INTRAMUSCULAR; INTRAVENOUS at 08:57

## 2021-08-13 NOTE — PROGRESS NOTES
"Chief Complaint  Follow-up (6 month)    Subjective          Lyndsay Garcia presents to [facility]  History of Present Illness  Sinusitis since Monday, congested, exhausted, 100F fever, no sore throat, cough, clear, negative COVID test recently, no COVID vaccine, patient has a history of COPD, she been wheezing also, no inhaler, she needs some refills today, also has fungus around fingernails for a while, sneezing, runny nose as well  Objective   Vital Signs:   /74   Pulse 74   Temp 97.7 °F (36.5 °C) (Infrared)   Ht 162.6 cm (64\")   Wt 54.9 kg (121 lb)   SpO2 93%   BMI 20.77 kg/m²     Physical Exam  Vitals and nursing note reviewed.   Constitutional:       Appearance: She is well-developed.   HENT:      Head: Normocephalic and atraumatic.      Right Ear: Tympanic membrane, ear canal and external ear normal.      Left Ear: Tympanic membrane, ear canal and external ear normal.      Nose: Nose normal.   Eyes:      General: No scleral icterus.        Right eye: No discharge.         Left eye: No discharge.      Conjunctiva/sclera: Conjunctivae normal.      Pupils: Pupils are equal, round, and reactive to light.   Neck:      Thyroid: No thyromegaly.      Vascular: No JVD.      Trachea: No tracheal deviation.   Cardiovascular:      Rate and Rhythm: Normal rate and regular rhythm.      Heart sounds: Normal heart sounds. No murmur heard.   No friction rub. No gallop.    Pulmonary:      Effort: Pulmonary effort is normal. No respiratory distress.      Breath sounds: No stridor. Wheezing present. No rhonchi or rales.      Comments: Inspiratory and expiratory wheezing bilateral  Chest:      Chest wall: No tenderness.   Abdominal:      General: Bowel sounds are normal. There is no distension.      Palpations: Abdomen is soft. There is no mass.      Tenderness: There is no abdominal tenderness. There is no guarding or rebound.      Hernia: No hernia is present.   Musculoskeletal:         " General: No tenderness. Normal range of motion.      Cervical back: Normal range of motion and neck supple.   Lymphadenopathy:      Cervical: No cervical adenopathy.   Skin:     General: Skin is warm and dry.      Comments: Very dry skin around the nailbed on all the fingers   Neurological:      Mental Status: She is alert.      Cranial Nerves: No cranial nerve deficit.      Sensory: No sensory deficit.      Motor: No abnormal muscle tone.      Coordination: Coordination normal.      Deep Tendon Reflexes: Reflexes normal.   Psychiatric:         Behavior: Behavior normal.         Thought Content: Thought content normal.         Judgment: Judgment normal.        Result Review :                 Assessment and Plan    Diagnoses and all orders for this visit:    1. Bronchitis (Primary)  -     doxycycline (VIBRAMYICN) 100 MG tablet; Take 1 tablet by mouth 2 (Two) Times a Day for 10 days.  Dispense: 20 tablet; Refill: 0    2. High triglycerides  -     Comprehensive Metabolic Panel  -     Lipid Panel  -     CBC & Differential  -     TSH  -     T3, Free  -     T4, Free    3. Hypothyroidism, unspecified type  -     Comprehensive Metabolic Panel  -     Lipid Panel  -     CBC & Differential  -     TSH  -     T3, Free  -     T4, Free    4. Vitamin D deficiency  -     Comprehensive Metabolic Panel  -     Lipid Panel  -     CBC & Differential  -     TSH  -     T3, Free  -     T4, Free  -     Vitamin D 25 hydroxy    5. Wheezing  -     albuterol sulfate  (90 Base) MCG/ACT inhaler; Inhale 2 puffs Every 6 (Six) Hours As Needed for Wheezing for up to 180 days.  Dispense: 18 g; Refill: 3    6. Cough  -     brompheniramine-pseudoephedrine-DM 30-2-10 MG/5ML syrup; Take 10 mL by mouth 4 (Four) Times a Day As Needed for Cough or Allergies.  Dispense: 118 mL; Refill: 1    7. Tinea  -     butenafine (Lotrimin Ultra) 1 % cream; Apply  topically to the appropriate area as directed 2 (Two) Times a Day.  Dispense: 30 g; Refill: 1    8.  Muscle spasms of neck  -     Discontinue: tiZANidine (ZANAFLEX) 4 MG tablet; Take 1 tablet by mouth At Night As Needed for Muscle Spasms.  Dispense: 30 tablet; Refill: 3  -     tiZANidine (ZANAFLEX) 4 MG tablet; Take 1 tablet by mouth At Night As Needed for Muscle Spasms.  Dispense: 30 tablet; Refill: 3    9. COPD with exacerbation (CMS/[facility])  -     albuterol sulfate  (90 Base) MCG/ACT inhaler; Inhale 2 puffs Every 6 (Six) Hours As Needed for Wheezing for up to 180 days.  Dispense: 18 g; Refill: 3  -     methylPREDNISolone (MEDROL) 4 MG dose pack; Take as directed on package instructions. Start tomorrow if no better  Dispense: 21 each; Refill: 0  -     methylPREDNISolone sodium succinate (SOLU-Medrol) injection 40 mg    10. Smoker  Comments:  quit smoking advised        Follow Up   Return in about 6 months (around 2/13/2022).  Patient was given instructions and counseling regarding her condition or for health maintenance advice. Please see specific information pulled into the AVS if appropriate.

## 2021-08-14 LAB
25(OH)D3+25(OH)D2 SERPL-MCNC: 84.2 NG/ML (ref 30–100)
ALBUMIN SERPL-MCNC: 4.5 G/DL (ref 3.5–5.2)
ALBUMIN/GLOB SERPL: 1.5 G/DL
ALP SERPL-CCNC: 143 U/L (ref 39–117)
ALT SERPL-CCNC: 15 U/L (ref 1–33)
AST SERPL-CCNC: 17 U/L (ref 1–32)
BASOPHILS # BLD AUTO: 0.05 10*3/MM3 (ref 0–0.2)
BASOPHILS NFR BLD AUTO: 0.8 % (ref 0–1.5)
BILIRUB SERPL-MCNC: 0.4 MG/DL (ref 0–1.2)
BUN SERPL-MCNC: 5 MG/DL (ref 6–20)
BUN/CREAT SERPL: 7.8 (ref 7–25)
CALCIUM SERPL-MCNC: 10.2 MG/DL (ref 8.6–10.5)
CHLORIDE SERPL-SCNC: 101 MMOL/L (ref 98–107)
CHOLEST SERPL-MCNC: 148 MG/DL (ref 0–200)
CO2 SERPL-SCNC: 27.9 MMOL/L (ref 22–29)
CREAT SERPL-MCNC: 0.64 MG/DL (ref 0.57–1)
EOSINOPHIL # BLD AUTO: 0.08 10*3/MM3 (ref 0–0.4)
EOSINOPHIL NFR BLD AUTO: 1.3 % (ref 0.3–6.2)
ERYTHROCYTE [DISTWIDTH] IN BLOOD BY AUTOMATED COUNT: 13.1 % (ref 12.3–15.4)
GLOBULIN SER CALC-MCNC: 3 GM/DL
GLUCOSE SERPL-MCNC: 92 MG/DL (ref 65–99)
HCT VFR BLD AUTO: 47.4 % (ref 34–46.6)
HDLC SERPL-MCNC: 37 MG/DL (ref 40–60)
HGB BLD-MCNC: 15.6 G/DL (ref 12–15.9)
IMM GRANULOCYTES # BLD AUTO: 0.02 10*3/MM3 (ref 0–0.05)
IMM GRANULOCYTES NFR BLD AUTO: 0.3 % (ref 0–0.5)
LDLC SERPL CALC-MCNC: 84 MG/DL (ref 0–100)
LYMPHOCYTES # BLD AUTO: 1.96 10*3/MM3 (ref 0.7–3.1)
LYMPHOCYTES NFR BLD AUTO: 32 % (ref 19.6–45.3)
MCH RBC QN AUTO: 28.6 PG (ref 26.6–33)
MCHC RBC AUTO-ENTMCNC: 32.9 G/DL (ref 31.5–35.7)
MCV RBC AUTO: 86.8 FL (ref 79–97)
MONOCYTES # BLD AUTO: 0.47 10*3/MM3 (ref 0.1–0.9)
MONOCYTES NFR BLD AUTO: 7.7 % (ref 5–12)
NEUTROPHILS # BLD AUTO: 3.54 10*3/MM3 (ref 1.7–7)
NEUTROPHILS NFR BLD AUTO: 57.9 % (ref 42.7–76)
NRBC BLD AUTO-RTO: 0 /100 WBC (ref 0–0.2)
PLATELET # BLD AUTO: 449 10*3/MM3 (ref 140–450)
POTASSIUM SERPL-SCNC: 4.7 MMOL/L (ref 3.5–5.2)
PROT SERPL-MCNC: 7.5 G/DL (ref 6–8.5)
RBC # BLD AUTO: 5.46 10*6/MM3 (ref 3.77–5.28)
SODIUM SERPL-SCNC: 139 MMOL/L (ref 136–145)
T3FREE SERPL-MCNC: 4.1 PG/ML (ref 2–4.4)
T4 FREE SERPL-MCNC: 0.97 NG/DL (ref 0.93–1.7)
TRIGL SERPL-MCNC: 155 MG/DL (ref 0–150)
TSH SERPL DL<=0.005 MIU/L-ACNC: 3.41 UIU/ML (ref 0.27–4.2)
VLDLC SERPL CALC-MCNC: 27 MG/DL (ref 5–40)
WBC # BLD AUTO: 6.12 10*3/MM3 (ref 3.4–10.8)

## 2021-10-04 ENCOUNTER — TELEMEDICINE (OUTPATIENT)
Dept: FAMILY MEDICINE CLINIC | Facility: CLINIC | Age: 56
End: 2021-10-04

## 2021-10-04 ENCOUNTER — TELEPHONE (OUTPATIENT)
Dept: FAMILY MEDICINE CLINIC | Facility: CLINIC | Age: 56
End: 2021-10-04

## 2021-10-04 DIAGNOSIS — R30.0 DYSURIA: Primary | ICD-10-CM

## 2021-10-04 PROCEDURE — 99213 OFFICE O/P EST LOW 20 MIN: CPT | Performed by: FAMILY MEDICINE

## 2021-10-04 RX ORDER — NITROFURANTOIN 25; 75 MG/1; MG/1
100 CAPSULE ORAL 2 TIMES DAILY
Qty: 14 CAPSULE | Refills: 0 | Status: SHIPPED | OUTPATIENT
Start: 2021-10-04 | End: 2022-02-07

## 2021-10-04 NOTE — PROGRESS NOTES
Chief Complaint  No chief complaint on file.  UTI  Subjective          Lyndsay Garcia presents to [facility]  History of Present Illness  UTI  Patient agreed to be contacted by Nicholas  Dysuria since last night no fever, + urgency  Objective   Vital Signs:   There were no vitals taken for this visit.      Result Review :                 Assessment and Plan    Diagnoses and all orders for this visit:    1. Dysuria (Primary)  -     nitrofurantoin, macrocrystal-monohydrate, (Macrobid) 100 MG capsule; Take 1 capsule by mouth 2 (Two) Times a Day.  Dispense: 14 capsule; Refill: 0        Follow Up   No follow-ups on file.  Patient was given instructions and counseling regarding her condition or for health maintenance advice. Please see specific information pulled into the AVS if appropriate.   Discussed with patient also latest labs, to recheck cholesterol in 6 months  Time spent 15 minutes

## 2021-10-04 NOTE — TELEPHONE ENCOUNTER
Spoke with patient and informed her that we would need to see her for these symptoms.  She stated that she knows she has a UTI and she can not come in because she works and lives to far.

## 2021-10-04 NOTE — TELEPHONE ENCOUNTER
Caller: Lyndsay Garcia    Relationship: Self    Best call back number: [phone]     What medication are you requesting: ANY    What are your current symptoms: BURNING / FREQUENT URINATION    How long have you been experiencing symptoms: 1 DAY    Have you had these symptoms before:    [x] Yes  [] No    Have you been treated for these symptoms before:   [x] Yes  [] No    If a prescription is needed, what is your preferred pharmacy and phone number: [facility] [facility] & [place] - [phone]  - [phone] FX

## 2022-02-07 ENCOUNTER — OFFICE VISIT (OUTPATIENT)
Dept: FAMILY MEDICINE CLINIC | Facility: CLINIC | Age: 57
End: 2022-02-07

## 2022-02-07 VITALS
OXYGEN SATURATION: 98 % | SYSTOLIC BLOOD PRESSURE: 100 MMHG | DIASTOLIC BLOOD PRESSURE: 60 MMHG | HEART RATE: 74 BPM | WEIGHT: 118 LBS | BODY MASS INDEX: 20.25 KG/M2 | TEMPERATURE: 97.5 F

## 2022-02-07 DIAGNOSIS — U07.1 COVID-19 VIRUS INFECTION: ICD-10-CM

## 2022-02-07 DIAGNOSIS — E03.9 HYPOTHYROIDISM, UNSPECIFIED TYPE: Primary | ICD-10-CM

## 2022-02-07 DIAGNOSIS — E55.9 VITAMIN D DEFICIENCY: ICD-10-CM

## 2022-02-07 DIAGNOSIS — E78.2 ELEVATED TRIGLYCERIDES WITH HIGH CHOLESTEROL: ICD-10-CM

## 2022-02-07 PROCEDURE — 99214 OFFICE O/P EST MOD 30 MIN: CPT | Performed by: FAMILY MEDICINE

## 2022-02-07 RX ORDER — LEVOTHYROXINE AND LIOTHYRONINE 38; 9 UG/1; UG/1
60 TABLET ORAL DAILY
Qty: 90 TABLET | Refills: 3 | Status: SHIPPED | OUTPATIENT
Start: 2022-02-07 | End: 2022-08-08 | Stop reason: SDUPTHER

## 2022-02-07 NOTE — PROGRESS NOTES
Chief Complaint  Hypothyroidism    Subjective          Lyndsay Garcia presents to [facility]  History of Present Illness  1/25/22 had Covid , then negative, 2/1/22, completed quarantine, no therapy, had cough and stuffy nose, now better, also smoker 1/2 ppd, fingers white when is cold, daughter would not allow patient to see her grandkids, she needs a note since she is no longer contagious, she completed quarantine, has no symptoms, she took a picture of the negative test she did at home, patient has no chest pain or shortness of breath, no fever, no wheezing, patient not fasting  Objective   Vital Signs:   /60 (BP Location: Right arm, Patient Position: Sitting, Cuff Size: Adult)   Pulse 74   Temp 97.5 °F (36.4 °C) (Temporal)   Wt 53.5 kg (118 lb)   SpO2 98%   BMI 20.25 kg/m²     Physical Exam  Vitals and nursing note reviewed.   Constitutional:       Appearance: She is well-developed.   HENT:      Head: Normocephalic and atraumatic.      Right Ear: Tympanic membrane, ear canal and external ear normal.      Left Ear: Tympanic membrane, ear canal and external ear normal.      Nose: Nose normal.   Eyes:      General: No scleral icterus.        Right eye: No discharge.         Left eye: No discharge.      Conjunctiva/sclera: Conjunctivae normal.      Pupils: Pupils are equal, round, and reactive to light.   Neck:      Thyroid: No thyromegaly.      Vascular: No JVD.      Trachea: No tracheal deviation.   Cardiovascular:      Rate and Rhythm: Normal rate and regular rhythm.      Heart sounds: Normal heart sounds. No murmur heard.  No friction rub. No gallop.    Pulmonary:      Effort: Pulmonary effort is normal. No respiratory distress.      Breath sounds: Normal breath sounds. No wheezing or rales.   Chest:      Chest wall: No tenderness.   Abdominal:      General: Bowel sounds are normal. There is no distension.      Palpations: Abdomen is soft. There is no mass.       Tenderness: There is no abdominal tenderness. There is no guarding or rebound.      Hernia: No hernia is present.   Musculoskeletal:         General: No tenderness. Normal range of motion.      Cervical back: Normal range of motion and neck supple.   Lymphadenopathy:      Cervical: No cervical adenopathy.   Skin:     General: Skin is warm and dry.   Neurological:      General: No focal deficit present.      Mental Status: She is alert.      Cranial Nerves: No cranial nerve deficit.      Sensory: No sensory deficit.      Motor: No abnormal muscle tone.      Coordination: Coordination normal.      Deep Tendon Reflexes: Reflexes normal.   Psychiatric:         Behavior: Behavior normal.         Thought Content: Thought content normal.         Judgment: Judgment normal.        Result Review :                 Assessment and Plan    Diagnoses and all orders for this visit:    1. Hypothyroidism, unspecified type (Primary)  -     TSH  -     T3, Free  -     T4, Free  -     Thyroid (ARMOUR THYROID) 60 MG PO tablet; Take 1 tablet by mouth Daily.  Dispense: 90 tablet; Refill: 3    2. COVID-19 virus infection  Comments:  Patient completed quarantine, okay to start vaccination around the 25th of this month,    3. Vitamin D deficiency  Comments:  declined lab at this moment    4. Elevated triglycerides with high cholesterol  Comments:  Continue diet and exercise, quit smoking, recheck in 3 months        Follow Up   No follow-ups on file.  Patient was given instructions and counseling regarding her condition or for health maintenance advice. Please see specific information pulled into the AVS if appropriate.   Patient declined a ankle-brachial index

## 2022-02-08 DIAGNOSIS — Z88.9 H/O MULTIPLE ALLERGIES: Primary | ICD-10-CM

## 2022-02-08 LAB
T3FREE SERPL-MCNC: 4 PG/ML (ref 2–4.4)
T4 FREE SERPL-MCNC: 1 NG/DL (ref 0.82–1.77)
TSH SERPL DL<=0.005 MIU/L-ACNC: 4.51 UIU/ML (ref 0.45–4.5)

## 2022-02-08 RX ORDER — LEVOCETIRIZINE DIHYDROCHLORIDE 5 MG/1
5 TABLET, FILM COATED ORAL EVERY EVENING
Qty: 90 TABLET | Refills: 3 | Status: SHIPPED | OUTPATIENT
Start: 2022-02-08 | End: 2022-02-21

## 2022-02-08 RX ORDER — LEVOCETIRIZINE DIHYDROCHLORIDE 5 MG/1
5 TABLET, FILM COATED ORAL EVERY EVENING
COMMUNITY
End: 2022-02-08 | Stop reason: SDUPTHER

## 2022-02-19 DIAGNOSIS — Z88.9 H/O MULTIPLE ALLERGIES: ICD-10-CM

## 2022-02-21 RX ORDER — LEVOCETIRIZINE DIHYDROCHLORIDE 5 MG/1
TABLET, FILM COATED ORAL
Qty: 90 TABLET | Refills: 3 | Status: SHIPPED | OUTPATIENT
Start: 2022-02-21 | End: 2022-08-08 | Stop reason: SDUPTHER

## 2022-02-21 NOTE — TELEPHONE ENCOUNTER
Rx Refill Note  Requested Prescriptions     Pending Prescriptions Disp Refills   • levocetirizine (XYZAL) 5 MG tablet [Pharmacy Med Name: LEVOCETIRIZINE 5 MG TABLET] 90 tablet 3     Sig: TAKE ONE TABLET BY MOUTH EVERY EVENING      Last office visit with prescribing clinician: 2/7/2022      Next office visit with prescribing clinician: 8/8/2022            Arnel Quevedo MA  02/21/22, 09:24 EST

## 2022-04-24 DIAGNOSIS — T78.40XS ALLERGY, SEQUELA: ICD-10-CM

## 2022-04-25 RX ORDER — MONTELUKAST SODIUM 10 MG/1
TABLET ORAL
Qty: 90 TABLET | Refills: 3 | Status: SHIPPED | OUTPATIENT
Start: 2022-04-25 | End: 2022-08-08 | Stop reason: SDUPTHER

## 2022-04-25 NOTE — TELEPHONE ENCOUNTER
Rx Refill Note  Requested Prescriptions     Pending Prescriptions Disp Refills   • montelukast (SINGULAIR) 10 MG tablet [Pharmacy Med Name: MONTELUKAST SOD 10 MG TABLET] 90 tablet 3     Sig: TAKE ONE TABLET BY MOUTH ONCE NIGHTLY      Last office visit with prescribing clinician: 2/7/2022      Next office visit with prescribing clinician: 8/8/2022       Last prescribed on 02/12/2021.         Francisco Javier Corley MA/LMR  04/25/22, 07:57 EDT

## 2022-07-01 DIAGNOSIS — M62.838 MUSCLE SPASMS OF NECK: ICD-10-CM

## 2022-07-05 RX ORDER — TIZANIDINE 4 MG/1
4 TABLET ORAL NIGHTLY PRN
Qty: 30 TABLET | Refills: 3 | Status: SHIPPED | OUTPATIENT
Start: 2022-07-05 | End: 2022-08-08 | Stop reason: SDUPTHER

## 2022-07-05 NOTE — TELEPHONE ENCOUNTER
Rx Refill Note  Requested Prescriptions     Pending Prescriptions Disp Refills   • tiZANidine (ZANAFLEX) 4 MG tablet [Pharmacy Med Name: tiZANidine HCL 4MG TABLET] 30 tablet 3     Sig: TAKE 1 TABLET BY MOUTH AT NIGHT AS NEEDED FOR MUSCLE SPASMS      Last office visit with prescribing clinician: 2/7/2022      Next office visit with prescribing clinician: 8/8/2022       Last prescribed on 08/13/2021.         Francisco Javier Corley MA/LMR  07/05/22, 07:48 EDT

## 2022-08-08 ENCOUNTER — OFFICE VISIT (OUTPATIENT)
Dept: FAMILY MEDICINE CLINIC | Facility: CLINIC | Age: 57
End: 2022-08-08

## 2022-08-08 VITALS
OXYGEN SATURATION: 94 % | WEIGHT: 119.2 LBS | TEMPERATURE: 97.8 F | BODY MASS INDEX: 20.35 KG/M2 | HEART RATE: 73 BPM | HEIGHT: 64 IN | RESPIRATION RATE: 15 BRPM | SYSTOLIC BLOOD PRESSURE: 116 MMHG | DIASTOLIC BLOOD PRESSURE: 84 MMHG

## 2022-08-08 DIAGNOSIS — E55.9 VITAMIN D DEFICIENCY: ICD-10-CM

## 2022-08-08 DIAGNOSIS — R05.9 COUGH: ICD-10-CM

## 2022-08-08 DIAGNOSIS — R94.31 ABNORMAL EKG: ICD-10-CM

## 2022-08-08 DIAGNOSIS — J06.9 UPPER RESPIRATORY TRACT INFECTION, UNSPECIFIED TYPE: ICD-10-CM

## 2022-08-08 DIAGNOSIS — R06.02 SHORTNESS OF BREATH: ICD-10-CM

## 2022-08-08 DIAGNOSIS — J44.1 COPD WITH EXACERBATION: ICD-10-CM

## 2022-08-08 DIAGNOSIS — Z88.9 H/O MULTIPLE ALLERGIES: ICD-10-CM

## 2022-08-08 DIAGNOSIS — T78.40XS ALLERGY, SEQUELA: ICD-10-CM

## 2022-08-08 DIAGNOSIS — J02.9 SORE THROAT: ICD-10-CM

## 2022-08-08 DIAGNOSIS — E03.9 HYPOTHYROIDISM, UNSPECIFIED TYPE: Primary | ICD-10-CM

## 2022-08-08 DIAGNOSIS — M62.838 MUSCLE SPASMS OF NECK: ICD-10-CM

## 2022-08-08 DIAGNOSIS — E78.1 HIGH TRIGLYCERIDES: ICD-10-CM

## 2022-08-08 LAB
EXPIRATION DATE: NORMAL
INTERNAL CONTROL: NORMAL
Lab: NORMAL
S PYO AG THROAT QL: NEGATIVE

## 2022-08-08 PROCEDURE — 93000 ELECTROCARDIOGRAM COMPLETE: CPT | Performed by: FAMILY MEDICINE

## 2022-08-08 PROCEDURE — 71046 X-RAY EXAM CHEST 2 VIEWS: CPT | Performed by: FAMILY MEDICINE

## 2022-08-08 PROCEDURE — 87880 STREP A ASSAY W/OPTIC: CPT | Performed by: FAMILY MEDICINE

## 2022-08-08 PROCEDURE — 99214 OFFICE O/P EST MOD 30 MIN: CPT | Performed by: FAMILY MEDICINE

## 2022-08-08 RX ORDER — MONTELUKAST SODIUM 10 MG/1
10 TABLET ORAL NIGHTLY
Qty: 90 TABLET | Refills: 3 | Status: SHIPPED | OUTPATIENT
Start: 2022-08-08 | End: 2023-02-20 | Stop reason: ALTCHOICE

## 2022-08-08 RX ORDER — TIZANIDINE 4 MG/1
4 TABLET ORAL NIGHTLY PRN
Qty: 30 TABLET | Refills: 3 | Status: SHIPPED | OUTPATIENT
Start: 2022-08-08

## 2022-08-08 RX ORDER — GLYCOPYRROLATE AND FORMOTEROL FUMARATE 9; 4.8 UG/1; UG/1
2 AEROSOL, METERED RESPIRATORY (INHALATION) 2 TIMES DAILY
Qty: 10.7 G | Refills: 3 | Status: SHIPPED | OUTPATIENT
Start: 2022-08-08 | End: 2023-03-09 | Stop reason: SDUPTHER

## 2022-08-08 RX ORDER — LANOLIN ALCOHOL/MO/W.PET/CERES
1000 CREAM (GRAM) TOPICAL DAILY
COMMUNITY

## 2022-08-08 RX ORDER — DOXYCYCLINE HYCLATE 100 MG
100 TABLET ORAL 2 TIMES DAILY
Qty: 20 TABLET | Refills: 0 | Status: SHIPPED | OUTPATIENT
Start: 2022-08-08 | End: 2022-08-18

## 2022-08-08 RX ORDER — LEVOTHYROXINE AND LIOTHYRONINE 38; 9 UG/1; UG/1
60 TABLET ORAL DAILY
Qty: 90 TABLET | Refills: 3 | Status: SHIPPED | OUTPATIENT
Start: 2022-08-08 | End: 2023-03-30 | Stop reason: DRUGHIGH

## 2022-08-08 RX ORDER — ALBUTEROL SULFATE 90 UG/1
2 AEROSOL, METERED RESPIRATORY (INHALATION) EVERY 6 HOURS PRN
Qty: 18 G | Refills: 3 | Status: SHIPPED | OUTPATIENT
Start: 2022-08-08 | End: 2023-03-09 | Stop reason: SDUPTHER

## 2022-08-08 RX ORDER — GUAIFENESIN 200 MG/10ML
200 LIQUID ORAL 3 TIMES DAILY PRN
Qty: 180 ML | Refills: 1 | Status: SHIPPED | OUTPATIENT
Start: 2022-08-08 | End: 2023-01-19

## 2022-08-08 RX ORDER — LEVOCETIRIZINE DIHYDROCHLORIDE 5 MG/1
5 TABLET, FILM COATED ORAL EVERY EVENING
Qty: 90 TABLET | Refills: 3 | Status: SHIPPED | OUTPATIENT
Start: 2022-08-08

## 2022-08-08 NOTE — PROGRESS NOTES
"Chief Complaint  Hypothyroidism (6 month follow up)    Subjective        Lyndsay Garcia presents to [facility]  History of Present Illness  Fasting , no CP/SOA, SOA since COVID in January, clear sputum, wheezing , on Vevespi, and albuterol, no CP, Mom with MI    ECG 12 Lead    Date/Time: 8/8/2022 8:00 AM  Performed by: Christiano Hector MD  Authorized by: Christiano Hector MD   Comparison: not compared with previous ECG   Previous ECG: no previous ECG available  Rhythm: sinus rhythm  Rate: normal  Conduction: conduction normal  ST Segments: ST segments normal  T Waves: T waves normal  QRS axis: normal  Other findings comments: Right ventricular conduction delay    Clinical impression: non-specific ECG            Objective   Vital Signs:  /84 (BP Location: Left arm, Patient Position: Sitting, Cuff Size: Adult)   Pulse 73   Temp 97.8 °F (36.6 °C) (Infrared)   Resp 15   Ht 162.6 cm (64\")   Wt 54.1 kg (119 lb 3.2 oz)   SpO2 94%   BMI 20.46 kg/m²   Estimated body mass index is 20.46 kg/m² as calculated from the following:    Height as of this encounter: 162.6 cm (64\").    Weight as of this encounter: 54.1 kg (119 lb 3.2 oz).    BMI is within normal parameters. No other follow-up for BMI required.      Physical Exam  Vitals and nursing note reviewed.   Constitutional:       General: She is not in acute distress.     Appearance: Normal appearance. She is well-developed. She is not ill-appearing, toxic-appearing or diaphoretic.   HENT:      Head: Normocephalic and atraumatic.      Right Ear: Tympanic membrane, ear canal and external ear normal. There is no impacted cerumen.      Left Ear: Tympanic membrane, ear canal and external ear normal. There is no impacted cerumen.      Nose: Nose normal. No congestion or rhinorrhea.      Mouth/Throat:      Mouth: Mucous membranes are moist.      Pharynx: Oropharynx is clear. No oropharyngeal exudate or posterior oropharyngeal " erythema.   Eyes:      General: No scleral icterus.        Right eye: No discharge.         Left eye: No discharge.      Extraocular Movements: Extraocular movements intact.      Conjunctiva/sclera: Conjunctivae normal.      Pupils: Pupils are equal, round, and reactive to light.   Neck:      Thyroid: No thyromegaly.      Vascular: No carotid bruit or JVD.      Trachea: No tracheal deviation.   Cardiovascular:      Rate and Rhythm: Normal rate and regular rhythm.      Heart sounds: Normal heart sounds. No murmur heard.    No friction rub. No gallop.   Pulmonary:      Effort: Pulmonary effort is normal. No respiratory distress.      Breath sounds: No stridor. Rhonchi present. No wheezing or rales.   Chest:      Chest wall: No tenderness.   Abdominal:      General: Bowel sounds are normal. There is no distension.      Palpations: Abdomen is soft. There is no mass.      Tenderness: There is no abdominal tenderness. There is no right CVA tenderness, left CVA tenderness, guarding or rebound.      Hernia: No hernia is present.   Musculoskeletal:         General: Normal range of motion.      Cervical back: Normal range of motion and neck supple. No rigidity or tenderness.      Right lower leg: No edema.      Left lower leg: No edema.   Lymphadenopathy:      Cervical: No cervical adenopathy.   Skin:     General: Skin is warm and dry.      Coloration: Skin is not jaundiced.   Neurological:      General: No focal deficit present.      Mental Status: She is alert and oriented to person, place, and time. Mental status is at baseline.      Sensory: No sensory deficit.      Motor: No weakness or abnormal muscle tone.      Coordination: Coordination normal.      Gait: Gait normal.      Deep Tendon Reflexes: Reflexes normal.   Psychiatric:         Mood and Affect: Mood normal.         Behavior: Behavior normal.         Thought Content: Thought content normal.         Judgment: Judgment normal.        Result Review :                 Assessment and Plan   Diagnoses and all orders for this visit:    1. Hypothyroidism, unspecified type (Primary)  -     CBC & Differential  -     Comprehensive Metabolic Panel  -     Lipid Panel  -     TSH  -     T4, Free  -     T3, Free  -     Thyroid (ARMOUR THYROID) 60 MG PO tablet; Take 1 tablet by mouth Daily.  Dispense: 90 tablet; Refill: 3    2. High triglycerides  -     CBC & Differential  -     Comprehensive Metabolic Panel  -     Lipid Panel  -     TSH  -     T4, Free  -     T3, Free    3. Vitamin D deficiency  -     CBC & Differential  -     Comprehensive Metabolic Panel  -     Lipid Panel  -     TSH  -     T4, Free  -     T3, Free  -     Vitamin D 25 hydroxy    4. Muscle spasms of neck  -     tiZANidine (ZANAFLEX) 4 MG tablet; Take 1 tablet by mouth At Night As Needed for Muscle Spasms.  Dispense: 30 tablet; Refill: 3    5. Allergy, sequela  -     montelukast (SINGULAIR) 10 MG tablet; Take 1 tablet by mouth Every Night.  Dispense: 90 tablet; Refill: 3    6. H/O multiple allergies  -     levocetirizine (XYZAL) 5 MG tablet; Take 1 tablet by mouth Every Evening.  Dispense: 90 tablet; Refill: 3    7. Shortness of breath  -     XR Chest PA & Lateral  -     ECG 12 Lead  -     albuterol sulfate  (90 Base) MCG/ACT inhaler; Inhale 2 puffs Every 6 (Six) Hours As Needed for Wheezing or Shortness of Air.  Dispense: 18 g; Refill: 3  -     Ambulatory Referral to Cardiology  -     COVID-19,[facility] ROUTINE, NP/OP SWAB IN TRANSPORT MEDIA OR ESWAB 72 HR TAT - Swab, Nasopharynx    8. Sore throat  -     POC Rapid Strep A  -     Cancel: POCT SARS-CoV-2 Antigen JAMAL + Flu  -     COVID-19,[facility] ROUTINE, NP/OP SWAB IN TRANSPORT MEDIA OR ESWAB 72 HR TAT - Swab, Nasopharynx    9. COPD with exacerbation (HCC)  -     Glycopyrrolate-Formoterol (Bevespi Aerosphere) 9-4.8 MCG/ACT aerosol; Inhale 2 sprays 2 (Two) Times a Day.  Dispense: 10.7 g; Refill: 3    10. Abnormal EKG  -     Ambulatory Referral to Cardiology    11. Upper  respiratory tract infection, unspecified type  -     doxycycline (VIBRAMYICN) 100 MG tablet; Take 1 tablet by mouth 2 (Two) Times a Day for 10 days.  Dispense: 20 tablet; Refill: 0    12. Cough  -     guaifenesin (ROBITUSSIN) 100 MG/5ML liquid; Take 10 mL by mouth 3 (Three) Times a Day As Needed for Cough or Congestion.  Dispense: 180 mL; Refill: 1      A Chest X-Ray was ordered. My reading of this film is negative. (No comparison films available: pending review by Radiologist.)  Quit Smoking advised        Follow Up   Return in about 4 weeks (around 9/5/2022).  Patient was given instructions and counseling regarding her condition or for health maintenance advice. Please see specific information pulled into the AVS if appropriate.   Patient agreed to go to the ER if no improvement  Saw pulmonologist already was told has COPD

## 2022-08-09 LAB
25(OH)D3+25(OH)D2 SERPL-MCNC: 64.8 NG/ML (ref 30–100)
ALBUMIN SERPL-MCNC: 4.6 G/DL (ref 3.8–4.9)
ALBUMIN/GLOB SERPL: 1.5 {RATIO} (ref 1.2–2.2)
ALP SERPL-CCNC: 143 IU/L (ref 44–121)
ALT SERPL-CCNC: 13 IU/L (ref 0–32)
AST SERPL-CCNC: 17 IU/L (ref 0–40)
BASOPHILS # BLD AUTO: 0 X10E3/UL (ref 0–0.2)
BASOPHILS NFR BLD AUTO: 1 %
BILIRUB SERPL-MCNC: 0.4 MG/DL (ref 0–1.2)
BUN SERPL-MCNC: 8 MG/DL (ref 6–24)
BUN/CREAT SERPL: 12 (ref 9–23)
CALCIUM SERPL-MCNC: 9.8 MG/DL (ref 8.7–10.2)
CHLORIDE SERPL-SCNC: 101 MMOL/L (ref 96–106)
CHOLEST SERPL-MCNC: 149 MG/DL (ref 100–199)
CO2 SERPL-SCNC: 26 MMOL/L (ref 20–29)
CREAT SERPL-MCNC: 0.67 MG/DL (ref 0.57–1)
EGFRCR SERPLBLD CKD-EPI 2021: 103 ML/MIN/1.73
EOSINOPHIL # BLD AUTO: 0 X10E3/UL (ref 0–0.4)
EOSINOPHIL NFR BLD AUTO: 0 %
ERYTHROCYTE [DISTWIDTH] IN BLOOD BY AUTOMATED COUNT: 14.3 % (ref 11.7–15.4)
GLOBULIN SER CALC-MCNC: 3 G/DL (ref 1.5–4.5)
GLUCOSE SERPL-MCNC: 99 MG/DL (ref 65–99)
HCT VFR BLD AUTO: 44.4 % (ref 34–46.6)
HDLC SERPL-MCNC: 37 MG/DL
HGB BLD-MCNC: 14.7 G/DL (ref 11.1–15.9)
IMM GRANULOCYTES # BLD AUTO: 0 X10E3/UL (ref 0–0.1)
IMM GRANULOCYTES NFR BLD AUTO: 0 %
LDLC SERPL CALC-MCNC: 89 MG/DL (ref 0–99)
LYMPHOCYTES # BLD AUTO: 2 X10E3/UL (ref 0.7–3.1)
LYMPHOCYTES NFR BLD AUTO: 26 %
MCH RBC QN AUTO: 28.5 PG (ref 26.6–33)
MCHC RBC AUTO-ENTMCNC: 33.1 G/DL (ref 31.5–35.7)
MCV RBC AUTO: 86 FL (ref 79–97)
MONOCYTES # BLD AUTO: 0.8 X10E3/UL (ref 0.1–0.9)
MONOCYTES NFR BLD AUTO: 10 %
NEUTROPHILS # BLD AUTO: 4.9 X10E3/UL (ref 1.4–7)
NEUTROPHILS NFR BLD AUTO: 63 %
PLATELET # BLD AUTO: 320 X10E3/UL (ref 150–450)
POTASSIUM SERPL-SCNC: 4.6 MMOL/L (ref 3.5–5.2)
PROT SERPL-MCNC: 7.6 G/DL (ref 6–8.5)
RBC # BLD AUTO: 5.15 X10E6/UL (ref 3.77–5.28)
SODIUM SERPL-SCNC: 140 MMOL/L (ref 134–144)
T3FREE SERPL-MCNC: 3.9 PG/ML (ref 2–4.4)
T4 FREE SERPL-MCNC: 0.93 NG/DL (ref 0.82–1.77)
TRIGL SERPL-MCNC: 129 MG/DL (ref 0–149)
TSH SERPL DL<=0.005 MIU/L-ACNC: 4.09 UIU/ML (ref 0.45–4.5)
VLDLC SERPL CALC-MCNC: 23 MG/DL (ref 5–40)
WBC # BLD AUTO: 7.8 X10E3/UL (ref 3.4–10.8)

## 2022-08-10 LAB
LABCORP SARS-COV-2, NAA 2 DAY TAT: NORMAL
SARS-COV-2 RNA RESP QL NAA+PROBE: NOT DETECTED

## 2022-08-11 ENCOUNTER — TELEPHONE (OUTPATIENT)
Dept: FAMILY MEDICINE CLINIC | Facility: CLINIC | Age: 57
End: 2022-08-11

## 2022-08-11 NOTE — TELEPHONE ENCOUNTER
Caller: Lyndsay Garcia    Relationship: Self    Best call back number: [phone]    Caller requesting test results: SELF    What test was performed: LABS    When was the test performed: 08/08/2022    Where was the test performed: IN-OFFICE    Additional notes: THE PATIENT ONLY WANTS HER PCP TO CONTACT HER REGARDING THESE RESULTS.       Yes

## 2022-08-15 ENCOUNTER — OFFICE VISIT (OUTPATIENT)
Dept: CARDIOLOGY | Facility: CLINIC | Age: 57
End: 2022-08-15

## 2022-08-15 VITALS
WEIGHT: 118 LBS | HEIGHT: 64 IN | DIASTOLIC BLOOD PRESSURE: 70 MMHG | SYSTOLIC BLOOD PRESSURE: 112 MMHG | BODY MASS INDEX: 20.14 KG/M2 | HEART RATE: 70 BPM

## 2022-08-15 DIAGNOSIS — R00.2 PALPITATIONS: ICD-10-CM

## 2022-08-15 DIAGNOSIS — Z72.0 TOBACCO USE: ICD-10-CM

## 2022-08-15 DIAGNOSIS — R94.31 ABNORMAL EKG: Primary | ICD-10-CM

## 2022-08-15 DIAGNOSIS — R06.09 DOE (DYSPNEA ON EXERTION): ICD-10-CM

## 2022-08-15 PROCEDURE — 99204 OFFICE O/P NEW MOD 45 MIN: CPT | Performed by: INTERNAL MEDICINE

## 2022-08-15 PROCEDURE — 93000 ELECTROCARDIOGRAM COMPLETE: CPT | Performed by: INTERNAL MEDICINE

## 2022-08-15 NOTE — PROGRESS NOTES
[facility] New Patient Office Note     Encounter Date:08/15/22  Patient:Lyndsay Garcia  :1965  MRN:[id number]    Referring Provider: Christiano Swann,*    Consulted for: Shortness of breath and palpitations    Chief Complaint:   Chief Complaint   Patient presents with   • Shortness of Breath   • Abnormal ECG       History of Presenting Illness:      Ms. Garcia is a 57 y.o. woman with past medical history notable for asthma and tobacco abuse who presents her office for initial valuation regarding abnormal EKG and symptoms of palpitations and shortness of breath.  In general she had been doing fairly well but did have COVID-19 this past January.  Ever since then her shortness of breath has been worse.  She did have a follow-up chest x-ray with her primary care physician which was normal.  She is now using her inhalers a lot more.  She has noticed a lot more shortness of breath and dyspnea exertion as well as episodes of palpitations and racing heart rate.  An EKG was performed which was abnormal/borderline in her primary care physician's office and thus was referred to us for further evaluation.      Review of Systems:  Review of Systems   Constitutional: Positive for malaise/fatigue.   HENT: Negative.    Eyes: Negative.    Cardiovascular: Positive for dyspnea on exertion and palpitations.   Respiratory: Positive for shortness of breath.    Endocrine: Negative.    Hematologic/Lymphatic: Negative.    Skin: Negative.    Musculoskeletal: Negative.    Gastrointestinal: Negative.    Genitourinary: Negative.    Neurological: Negative.    Psychiatric/Behavioral: Negative.    Allergic/Immunologic: Negative.        Current Outpatient Medications on File Prior to Visit   Medication Sig Dispense Refill   • albuterol sulfate  (90 Base) MCG/ACT inhaler Inhale 2 puffs Every 6 (Six) Hours As Needed for Wheezing or Shortness of Air. 18 g 3   • butenafine (Lotrimin Ultra) 1 % cream Apply  topically to the  appropriate area as directed 2 (Two) Times a Day. 30 g 1   • doxycycline (VIBRAMYICN) 100 MG tablet Take 1 tablet by mouth 2 (Two) Times a Day for 10 days. 20 tablet 0   • Glycopyrrolate-Formoterol (Bevespi Aerosphere) 9-4.8 MCG/ACT aerosol Inhale 2 sprays 2 (Two) Times a Day. 10.7 g 3   • guaifenesin (ROBITUSSIN) 100 MG/5ML liquid Take 10 mL by mouth 3 (Three) Times a Day As Needed for Cough or Congestion. 180 mL 1   • levocetirizine (XYZAL) 5 MG tablet Take 1 tablet by mouth Every Evening. 90 tablet 3   • montelukast (SINGULAIR) 10 MG tablet Take 1 tablet by mouth Every Night. 90 tablet 3   • Thyroid (ARMOUR THYROID) 60 MG PO tablet Take 1 tablet by mouth Daily. 90 tablet 3   • tiZANidine (ZANAFLEX) 4 MG tablet Take 1 tablet by mouth At Night As Needed for Muscle Spasms. 30 tablet 3   • vitamin B-12 (CYANOCOBALAMIN) 1000 MCG tablet Take 1,000 mcg by mouth Daily.     • VITAMIN D PO Take 5,000 Units by mouth Daily.     • [DISCONTINUED] Glycopyrrolate-Formoterol 9-4.8 MCG/ACT aerosol Inhale.       No current facility-administered medications on file prior to visit.       Allergies   Allergen Reactions   • Sulfa Antibiotics Itching       Past Medical History:   Diagnosis Date   • Abdominal pain    • Abnormal blood chemistry    • Abnormal Pap smear of cervix    • Acute maxillary sinusitis    • Allergic rhinitis    • Chronic constipation    • Chronic obstructive pulmonary disease (HCC)    • Constipation    • Fatigue    • Hematuria    • Hypothyroidism    • IBS (irritable bowel syndrome)    • Increased abdominal girth    • Migraine with aura, intractable    • Neck pain    • Perimenopause    • Vitamin D deficiency        Past Surgical History:   Procedure Laterality Date   • CATARACT EXTRACTION, BILATERAL  12/2018       Social History     Socioeconomic History   • Marital status: Unknown   Tobacco Use   • Smoking status: Current Every Day Smoker     Packs/day: 0.50     Years: 40.00     Pack years: 20.00     Types:  "Cigarettes   • Smokeless tobacco: Never Used   Substance and Sexual Activity   • Alcohol use: No     Comment: caffeine use   • Drug use: No   • Sexual activity: Yes     Partners: Male     Birth control/protection: None       Family History   Problem Relation Age of Onset   • Diabetes Mother    • Heart disease Mother    • Hypertension Mother    • Cancer Father    • Diabetes Sister    • Diabetes Sister    • Kidney disease Sister        The following portions of the patient's history were reviewed and updated as appropriate: allergies, current medications, past family history, past medical history, past social history, past surgical history and problem list.       Objective:       Vitals:    08/15/22 1300   BP: 112/70   BP Location: Left arm   Patient Position: Sitting   Pulse: 70   Weight: 53.5 kg (118 lb)   Height: 162.6 cm (64\")       Body mass index is 20.25 kg/m².    Physical Exam:  Constitutional: Well appearing, Well-developed, No acute distress   HENT: Oropharynx clear and membrane moist  Eyes: Normal conjunctiva, no sclera icterus.  Neck: Supple, no carotid bruit bilaterally.  Cardiovascular: Regular rate and rhythm, No Murmur, No bilateral lower extremity edema.  Pulmonary: Normal respiratory effort, Normal lung sounds, no wheezing.  Abdominal: Soft, nontender, no hepatosplenomegaly, liver is non-pulsatile.  Neurological: Alert and orient x 3.   Skin: Warm, dry, no ecchymosis, no rash.  Psych: Appropriate mood and affect. Normal judgment and insight.      Lab Results   Component Value Date     08/08/2022     08/13/2021    K 4.6 08/08/2022    K 4.7 08/13/2021     08/08/2022     08/13/2021    CO2 26 08/08/2022    CO2 27.9 08/13/2021    BUN 8 08/08/2022    BUN 5 (L) 08/13/2021    CREATININE 0.67 08/08/2022    CREATININE 0.64 08/13/2021    EGFRIFNONA 97 08/13/2021    EGFRIFNONA 82 02/12/2021    EGFRIFAFRI 117 08/13/2021    EGFRIFAFRI 100 02/12/2021    GLUCOSE 99 08/08/2022    GLUCOSE 92 " 08/13/2021    CALCIUM 9.8 08/08/2022    CALCIUM 10.2 08/13/2021    PROTENTOTREF 7.6 08/08/2022    PROTENTOTREF 7.5 08/13/2021    ALBUMIN 4.6 08/08/2022    ALBUMIN 4.50 08/13/2021    BILITOT 0.4 08/08/2022    BILITOT 0.4 08/13/2021    AST 17 08/08/2022    AST 17 08/13/2021    ALT 13 08/08/2022    ALT 15 08/13/2021     Lab Results   Component Value Date    WBC 7.8 08/08/2022    WBC 6.12 08/13/2021    HGB 14.7 08/08/2022    HGB 15.6 08/13/2021    HCT 44.4 08/08/2022    HCT 47.4 (H) 08/13/2021    MCV 86 08/08/2022    MCV 86.8 08/13/2021     08/08/2022     08/13/2021     Lab Results   Component Value Date    TRIG 129 08/08/2022    TRIG 155 (H) 08/13/2021    HDL 37 (L) 08/08/2022    HDL 37 (L) 08/13/2021    LDL 89 08/08/2022    LDL 84 08/13/2021     The 10-year ASCVD risk score (Strasburgtani COVARRUBIAS Jr., et al., 2013) is: 4%    Values used to calculate the score:      Age: 57 years      Sex: Female      Is Non- : No      Diabetic: No      Tobacco smoker: Yes      Systolic Blood Pressure: 112 mmHg      Is BP treated: No      HDL Cholesterol: 37 mg/dL      Total Cholesterol: 149 mg/dL     No results found for: PROBNP, BNP  No results found for: CKTOTAL, CKMB, CKMBINDEX, TROPONINI, TROPONINT  Lab Results   Component Value Date    TSH 4.090 08/08/2022    TSH 4.510 (H) 02/07/2022         ECG 12 Lead    Date/Time: 8/15/2022 4:21 PM  Performed by: Elia Walters MD  Authorized by: Elia Walters MD   Comparison: compared with previous ECG from 8/8/2022  Similar to previous ECG  Rhythm: sinus rhythm  Ectopy: unifocal PVCs              Assessment:          Diagnosis Plan   1. Abnormal EKG  Adult Transthoracic Echo Complete W/ Cont if Necessary Per Protocol    ECG 12 Lead   2. Tobacco use  ECG 12 Lead   3. ROSE (dyspnea on exertion)  Adult Transthoracic Echo Complete W/ Cont if Necessary Per Protocol    Holter Monitor - 48 Hour    ECG 12 Lead   4. Palpitations  Holter Monitor - 48 Hour    ECG 12 Lead           Plan:       Ms. Garcia is a 57 y.o. woman with past medical history notable for asthma and tobacco abuse who presents her office for initial valuation regarding abnormal EKG and symptoms of palpitations and shortness of breath.  Overall her symptoms are hard to pin down.  This could be related to her underlying lung disease and tobacco abuse but given that they have acutely worsened over the last 6 months after COVID infection and her chest x-ray is relatively unrevealing would like to exclude the possibility of cardiac sources including pulmonary hypertension, valvular disease, or cardiomyopathy.  Also ordered a Holter monitor to further evaluate her symptoms of palpitations.  These are normal and may look further at other possibilities such as having her see a pulmonologist but would hold off on any empiric medications at this time given good blood pressure and heart rate control.    Abnormal EKG:  · We will follow-up on echocardiogram and Holter monitor results  · No evidence of ischemia on EKG    Dyspnea exertion:  · Follow-up on echocardiogram results    Palpitations:  · Follow-up on Holter monitor results  · CMP and TSH 8/2022 within normal limits    Tobacco abuse:  · Patient not actually quitting smoking at this time    Follow Up:  Based on echo and holter results      Thank you for allowing me to participate in the care of Lyndsay Garcia. Feel free to contact me directly with any further questions or concerns.    Elia Walters MD  [facility]  08/15/22  16:25 EDT

## 2022-09-06 ENCOUNTER — HOSPITAL ENCOUNTER (OUTPATIENT)
Dept: CARDIOLOGY | Facility: HOSPITAL | Age: 57
Discharge: HOME | End: 2022-09-06
Admitting: INTERNAL MEDICINE

## 2022-09-06 VITALS
SYSTOLIC BLOOD PRESSURE: 110 MMHG | HEART RATE: 70 BPM | WEIGHT: 118 LBS | DIASTOLIC BLOOD PRESSURE: 66 MMHG | HEIGHT: 64 IN | BODY MASS INDEX: 20.14 KG/M2

## 2022-09-06 DIAGNOSIS — R06.09 DOE (DYSPNEA ON EXERTION): ICD-10-CM

## 2022-09-06 DIAGNOSIS — R94.31 ABNORMAL EKG: ICD-10-CM

## 2022-09-06 LAB
BH CV ECHO MEAS - ACS: 1.96 CM
BH CV ECHO MEAS - AO MAX PG: 7.5 MMHG
BH CV ECHO MEAS - AO MEAN PG: 3.6 MMHG
BH CV ECHO MEAS - AO ROOT DIAM: 3.1 CM
BH CV ECHO MEAS - AO V2 MAX: 137.2 CM/SEC
BH CV ECHO MEAS - AO V2 VTI: 28.7 CM
BH CV ECHO MEAS - AVA(I,D): 1.96 CM2
BH CV ECHO MEAS - EDV(CUBED): 65.6 ML
BH CV ECHO MEAS - EDV(MOD-SP2): 40 ML
BH CV ECHO MEAS - EDV(MOD-SP4): 41 ML
BH CV ECHO MEAS - EF(MOD-BP): 60 %
BH CV ECHO MEAS - EF(MOD-SP2): 60 %
BH CV ECHO MEAS - EF(MOD-SP4): 61 %
BH CV ECHO MEAS - EF_3D-VOL: 68 %
BH CV ECHO MEAS - ESV(MOD-SP2): 16 ML
BH CV ECHO MEAS - ESV(MOD-SP4): 16 ML
BH CV ECHO MEAS - FS: 35.5 %
BH CV ECHO MEAS - IVS/LVPW: 0.94 CM
BH CV ECHO MEAS - IVSD: 0.57 CM
BH CV ECHO MEAS - LAT PEAK E' VEL: 8.1 CM/SEC
BH CV ECHO MEAS - LV DIASTOLIC VOL/BSA (35-75): 26.2 CM2
BH CV ECHO MEAS - LV MASS(C)D: 64.3 GRAMS
BH CV ECHO MEAS - LV MAX PG: 3.2 MMHG
BH CV ECHO MEAS - LV MEAN PG: 1.36 MMHG
BH CV ECHO MEAS - LV SYSTOLIC VOL/BSA (12-30): 10.2 CM2
BH CV ECHO MEAS - LV V1 MAX: 89.5 CM/SEC
BH CV ECHO MEAS - LV V1 VTI: 20.4 CM
BH CV ECHO MEAS - LVIDD: 4 CM
BH CV ECHO MEAS - LVIDS: 2.6 CM
BH CV ECHO MEAS - LVOT AREA: 2.8 CM2
BH CV ECHO MEAS - LVOT DIAM: 1.87 CM
BH CV ECHO MEAS - LVPWD: 0.61 CM
BH CV ECHO MEAS - MED PEAK E' VEL: 9.4 CM/SEC
BH CV ECHO MEAS - MR MAX PG: 20.3 MMHG
BH CV ECHO MEAS - MR MAX VEL: 225.5 CM/SEC
BH CV ECHO MEAS - MV A DUR: 0.14 SEC
BH CV ECHO MEAS - MV A MAX VEL: 82.7 CM/SEC
BH CV ECHO MEAS - MV DEC SLOPE: 649 CM/SEC2
BH CV ECHO MEAS - MV DEC TIME: 0.27 MSEC
BH CV ECHO MEAS - MV E MAX VEL: 95.5 CM/SEC
BH CV ECHO MEAS - MV E/A: 1.15
BH CV ECHO MEAS - MV MAX PG: 4.4 MMHG
BH CV ECHO MEAS - MV MEAN PG: 1.96 MMHG
BH CV ECHO MEAS - MV P1/2T: 53.9 MSEC
BH CV ECHO MEAS - MV V2 VTI: 30.5 CM
BH CV ECHO MEAS - MVA(P1/2T): 4.1 CM2
BH CV ECHO MEAS - MVA(VTI): 1.84 CM2
BH CV ECHO MEAS - PA ACC TIME: 0.12 SEC
BH CV ECHO MEAS - PA PR(ACCEL): 25.1 MMHG
BH CV ECHO MEAS - PA V2 MAX: 90.2 CM/SEC
BH CV ECHO MEAS - PULM A REVS DUR: 0.13 SEC
BH CV ECHO MEAS - PULM A REVS VEL: 25.7 CM/SEC
BH CV ECHO MEAS - PULM DIAS VEL: 33 CM/SEC
BH CV ECHO MEAS - PULM S/D: 1.34
BH CV ECHO MEAS - PULM SYS VEL: 44.1 CM/SEC
BH CV ECHO MEAS - RAP SYSTOLE: 3 MMHG
BH CV ECHO MEAS - RV MAX PG: 1.83 MMHG
BH CV ECHO MEAS - RV V1 MAX: 67.7 CM/SEC
BH CV ECHO MEAS - RV V1 VTI: 16.5 CM
BH CV ECHO MEAS - RVSP: 19 MMHG
BH CV ECHO MEAS - SI(MOD-SP2): 15.4 ML/M2
BH CV ECHO MEAS - SI(MOD-SP4): 16 ML/M2
BH CV ECHO MEAS - SV(LVOT): 56.2 ML
BH CV ECHO MEAS - SV(MOD-SP2): 24 ML
BH CV ECHO MEAS - SV(MOD-SP4): 25 ML
BH CV ECHO MEAS - TAPSE (>1.6): 2.07 CM
BH CV ECHO MEAS - TR MAX PG: 15.6 MMHG
BH CV ECHO MEAS - TR MAX VEL: 197.6 CM/SEC
BH CV ECHO MEASUREMENTS AVERAGE E/E' RATIO: 10.91
BH CV XLRA - RV BASE: 2.47 CM
BH CV XLRA - RV LENGTH: 5.2 CM
BH CV XLRA - RV MID: 1.73 CM
BH CV XLRA - TDI S': 7.7 CM/SEC
LEFT ATRIUM VOLUME INDEX: 18 ML/M2
MAXIMAL PREDICTED HEART RATE: 165 BPM
STRESS TARGET HR: 140 BPM

## 2022-09-06 PROCEDURE — 93306 TTE W/DOPPLER COMPLETE: CPT

## 2022-09-06 PROCEDURE — 93306 TTE W/DOPPLER COMPLETE: CPT | Performed by: INTERNAL MEDICINE

## 2022-09-15 ENCOUNTER — TELEPHONE (OUTPATIENT)
Dept: CARDIOLOGY | Facility: CLINIC | Age: 57
End: 2022-09-15

## 2022-09-15 RX ORDER — ATENOLOL 25 MG/1
25 TABLET ORAL DAILY
Qty: 90 TABLET | Refills: 3 | Status: SHIPPED | OUTPATIENT
Start: 2022-09-15 | End: 2023-03-23

## 2022-09-15 NOTE — TELEPHONE ENCOUNTER
Patient called trying to get her test results. Echo and Holter results are both in [facility].     She can be reached at [phone]

## 2022-09-15 NOTE — TELEPHONE ENCOUNTER
Called patient with holter and echo results will try low dose atenolol to see if this helps out with her PVCs and monitor results, if still with ROSE may consider stress testing.

## 2022-10-31 ENCOUNTER — OFFICE VISIT (OUTPATIENT)
Dept: CARDIOLOGY | Facility: CLINIC | Age: 57
End: 2022-10-31

## 2022-10-31 VITALS
HEIGHT: 64 IN | DIASTOLIC BLOOD PRESSURE: 68 MMHG | WEIGHT: 121.4 LBS | SYSTOLIC BLOOD PRESSURE: 126 MMHG | HEART RATE: 51 BPM | BODY MASS INDEX: 20.73 KG/M2

## 2022-10-31 DIAGNOSIS — I49.3 PVC (PREMATURE VENTRICULAR CONTRACTION): Primary | ICD-10-CM

## 2022-10-31 DIAGNOSIS — Z72.0 TOBACCO USE: ICD-10-CM

## 2022-10-31 PROCEDURE — 99214 OFFICE O/P EST MOD 30 MIN: CPT | Performed by: INTERNAL MEDICINE

## 2022-10-31 PROCEDURE — 93000 ELECTROCARDIOGRAM COMPLETE: CPT | Performed by: INTERNAL MEDICINE

## 2022-10-31 NOTE — PROGRESS NOTES
[facility] Follow Up Office Note     Encounter Date:10/31/22  Patient:Lyndsay Garcia  :1965  MRN:[id number]      Chief Complaint:   Chief Complaint   Patient presents with   • Palpitations     6 week F/U     • ROSE       History of Presenting Illness:      Ms. Garcia is a 57 y.o. woman with past medical history notable for asthma and tobacco abuse who presents her office for scheduled follow-up.  Patient was last seen approximate 6 weeks ago for an initial visit due to shortness of breath and palpitations.  She did have further cardiac testing and we did start her on a low-dose atenolol due to continued symptoms of palpitations.  Fortunately patient's palpitations are really nonexistent.  She still is having a fair amount of shortness of breath however    Review of Systems:  Review of Systems   Constitutional: Positive for malaise/fatigue.   HENT: Negative.    Eyes: Negative.    Cardiovascular: Positive for dyspnea on exertion and palpitations.   Respiratory: Positive for shortness of breath.    Endocrine: Negative.    Hematologic/Lymphatic: Negative.    Skin: Negative.    Musculoskeletal: Negative.    Gastrointestinal: Negative.    Genitourinary: Negative.    Neurological: Negative.    Psychiatric/Behavioral: Negative.    Allergic/Immunologic: Negative.        Current Outpatient Medications on File Prior to Visit   Medication Sig Dispense Refill   • albuterol sulfate  (90 Base) MCG/ACT inhaler Inhale 2 puffs Every 6 (Six) Hours As Needed for Wheezing or Shortness of Air. 18 g 3   • atenolol (TENORMIN) 25 MG tablet Take 1 tablet by mouth Daily. 90 tablet 3   • Glycopyrrolate-Formoterol (Bevespi Aerosphere) 9-4.8 MCG/ACT aerosol Inhale 2 sprays 2 (Two) Times a Day. 10.7 g 3   • levocetirizine (XYZAL) 5 MG tablet Take 1 tablet by mouth Every Evening. 90 tablet 3   • montelukast (SINGULAIR) 10 MG tablet Take 1 tablet by mouth Every Night. 90 tablet 3   • Thyroid (ARMOUR THYROID) 60 MG PO tablet  Take 1 tablet by mouth Daily. 90 tablet 3   • tiZANidine (ZANAFLEX) 4 MG tablet Take 1 tablet by mouth At Night As Needed for Muscle Spasms. 30 tablet 3   • vitamin B-12 (CYANOCOBALAMIN) 1000 MCG tablet Take 1,000 mcg by mouth Daily.     • VITAMIN D PO Take 5,000 Units by mouth Daily.     • butenafine (Lotrimin Ultra) 1 % cream Apply  topically to the appropriate area as directed 2 (Two) Times a Day. 30 g 1   • guaifenesin (ROBITUSSIN) 100 MG/5ML liquid Take 10 mL by mouth 3 (Three) Times a Day As Needed for Cough or Congestion. 180 mL 1     No current facility-administered medications on file prior to visit.       Allergies   Allergen Reactions   • Sulfa Antibiotics Itching       Past Medical History:   Diagnosis Date   • Abdominal pain    • Abnormal blood chemistry    • Abnormal Pap smear of cervix    • Acute maxillary sinusitis    • Allergic rhinitis    • Chronic constipation    • Chronic obstructive pulmonary disease (HCC)    • Constipation    • Fatigue    • Hematuria    • Hypothyroidism    • IBS (irritable bowel syndrome)    • Increased abdominal girth    • Migraine with aura, intractable    • Neck pain    • Perimenopause    • Vitamin D deficiency        Past Surgical History:   Procedure Laterality Date   • CATARACT EXTRACTION, BILATERAL  12/2018       Social History     Socioeconomic History   • Marital status: Unknown   Tobacco Use   • Smoking status: Every Day     Packs/day: 0.50     Years: 40.00     Pack years: 20.00     Types: Cigarettes   • Smokeless tobacco: Never   Substance and Sexual Activity   • Alcohol use: No     Comment: caffeine use   • Drug use: No   • Sexual activity: Yes     Partners: Male     Birth control/protection: None       Family History   Problem Relation Age of Onset   • Diabetes Mother    • Heart disease Mother    • Hypertension Mother    • Cancer Father    • Diabetes Sister    • Diabetes Sister    • Kidney disease Sister        The following portions of the patient's history were  "reviewed and updated as appropriate: allergies, current medications, past family history, past medical history, past social history, past surgical history and problem list.       Objective:       Vitals:    10/31/22 1512   BP: 126/68   BP Location: Left arm   Patient Position: Sitting   Pulse: 51   Weight: 55.1 kg (121 lb 6.4 oz)   Height: 162.6 cm (64\")       Body mass index is 20.84 kg/m².    Physical Exam:  Constitutional: Well appearing, Well-developed, No acute distress   HENT: Oropharynx clear and membrane moist  Eyes: Normal conjunctiva, no sclera icterus.  Neck: Supple, no carotid bruit bilaterally.  Cardiovascular: Regular rate and rhythm, No Murmur, No bilateral lower extremity edema.  Pulmonary: Normal respiratory effort, Normal lung sounds, no wheezing.  Abdominal: Soft, nontender, no hepatosplenomegaly, liver is non-pulsatile.  Neurological: Alert and orient x 3.   Skin: Warm, dry, no ecchymosis, no rash.  Psych: Appropriate mood and affect. Normal judgment and insight.      Lab Results   Component Value Date     08/08/2022     08/13/2021    K 4.6 08/08/2022    K 4.7 08/13/2021     08/08/2022     08/13/2021    CO2 26 08/08/2022    CO2 27.9 08/13/2021    BUN 8 08/08/2022    BUN 5 (L) 08/13/2021    CREATININE 0.67 08/08/2022    CREATININE 0.64 08/13/2021    EGFRIFNONA 97 08/13/2021    EGFRIFNONA 82 02/12/2021    EGFRIFAFRI 117 08/13/2021    EGFRIFAFRI 100 02/12/2021    GLUCOSE 99 08/08/2022    GLUCOSE 92 08/13/2021    CALCIUM 9.8 08/08/2022    CALCIUM 10.2 08/13/2021    PROTENTOTREF 7.6 08/08/2022    PROTENTOTREF 7.5 08/13/2021    ALBUMIN 4.6 08/08/2022    ALBUMIN 4.50 08/13/2021    BILITOT 0.4 08/08/2022    BILITOT 0.4 08/13/2021    AST 17 08/08/2022    AST 17 08/13/2021    ALT 13 08/08/2022    ALT 15 08/13/2021     Lab Results   Component Value Date    WBC 7.8 08/08/2022    WBC 6.12 08/13/2021    HGB 14.7 08/08/2022    HGB 15.6 08/13/2021    HCT 44.4 08/08/2022    HCT 47.4 (H) " 08/13/2021    MCV 86 08/08/2022    MCV 86.8 08/13/2021     08/08/2022     08/13/2021     Lab Results   Component Value Date    TRIG 129 08/08/2022    TRIG 155 (H) 08/13/2021    HDL 37 (L) 08/08/2022    HDL 37 (L) 08/13/2021    LDL 89 08/08/2022    LDL 84 08/13/2021     The 10-year ASCVD risk score (Nikki MCDANIELS, et al., 2019) is: 5%    Values used to calculate the score:      Age: 57 years      Sex: Female      Is Non- : No      Diabetic: No      Tobacco smoker: Yes      Systolic Blood Pressure: 126 mmHg      Is BP treated: No      HDL Cholesterol: 37 mg/dL      Total Cholesterol: 149 mg/dL     No results found for: PROBNP, BNP  No results found for: CKTOTAL, CKMB, CKMBINDEX, TROPONINI, TROPONINT  Lab Results   Component Value Date    TSH 4.090 08/08/2022    TSH 4.510 (H) 02/07/2022         ECG 12 Lead    Date/Time: 10/31/2022 3:23 PM  Performed by: Elia Walters MD  Authorized by: Elia Walters MD   Comparison: compared with previous ECG from 8/15/2022  Similar to previous ECG  Rhythm: sinus rhythm        Holter monitor 9/14/2022 with tracings reviewed by myself:  · A relatively benign monitor study.  · Underlying heart rhythm was sinus rhythm with an average heart rate of 69 bpm and a heart rate range of 45 bpm up to 111 bpm  · 4.3% burden of premature ventricular contractions  · Patient triggered events correlated with underlying sinus rhythm.    Echocardiogram 9/6/2022 with images reviewed by myself:  · Left ventricular ejection fraction appears to be 61 - 65%.  · Left ventricular diastolic function was normal.  · Normal right ventricular cavity size and systolic function noted.  · Normal left atrial cavity size noted  · Interatrial septal aneurysm present.  · Calculated right ventricular systolic pressure from tricuspid regurgitation is 19 mmHg.  · There is a trivial pericardial effusion.          Assessment:          Diagnosis Plan   1. PVC (premature ventricular  contraction)  ECG 12 Lead      2. Tobacco use               Plan:       Ms. Garcia is a 57 y.o. woman with past medical history notable for asthma, PVCs, and tobacco abuse who presents for scheduled follow-up.  Overall she is doing well but I am a little concerned she still short of breath.  I did offer her referral to our pulmonology group as she likely has some component of lung disease.  She reports having a diagnosis of COPD in the past by pulmonologist from 2 to 3 years ago but was not too keen on seeking out further attention with our pulmonologist here.  She stated there was not a whole lot that they could offer besides expensive inhalers.  Did also offer and suggest considering a stress test as this could be an anginal equivalent although I think lung disease more likely.  She also declined considering stress test at this time but only could be fine but we will see her back in a few months and can reassess at that time.    Dyspnea on exertion:  · Patient declines pulmonary referral  · Patient like to hold off on stress test at this time  · She will work on trying to think about quitting smoking    PVCs:  · Continue atenolol  · Echocardiogram 9/2022 with structurally normal heart  · Holter monitor 9/2022 demonstrates a 4% burden of premature ventricular contraction   · CMP and TSH 8/2022 within normal limits    Tobacco abuse:  · Patient not interested in quitting smoking at this time    Follow Up:  Feb      Thank you for allowing me to participate in the care of Lyndsay Garcia. Feel free to contact me directly with any further questions or concerns.    Elia Walters MD  [facility]  10/31/22  15:36 EDT

## 2022-11-08 ENCOUNTER — TELEPHONE (OUTPATIENT)
Dept: FAMILY MEDICINE CLINIC | Facility: CLINIC | Age: 57
End: 2022-11-08

## 2022-11-08 NOTE — TELEPHONE ENCOUNTER
Caller: Lyndsay Garcia    Relationship: Self    Best call back number: [phone]    What was the call regarding: PATIENT STATED SHE HAS FOUND AN INHALER THAT IS CHEAPER THAN THE ONE SHE NORMALLY HAS.    SHE WOULD LIKE TO REQUEST [facility] MAIL HER A WRITTEN PRESCRIPTION FOR AN INHALER BEVESTPI.    PLEASE CALL IF THIS CAN BE DONE    Do you require a callback: YES

## 2023-01-19 ENCOUNTER — OFFICE VISIT (OUTPATIENT)
Dept: FAMILY MEDICINE CLINIC | Facility: CLINIC | Age: 58
End: 2023-01-19
Payer: COMMERCIAL

## 2023-01-19 VITALS
DIASTOLIC BLOOD PRESSURE: 68 MMHG | RESPIRATION RATE: 16 BRPM | HEART RATE: 57 BPM | SYSTOLIC BLOOD PRESSURE: 120 MMHG | OXYGEN SATURATION: 96 % | BODY MASS INDEX: 20.32 KG/M2 | WEIGHT: 119 LBS | TEMPERATURE: 97.3 F | HEIGHT: 64 IN

## 2023-01-19 DIAGNOSIS — R06.2 WHEEZES: ICD-10-CM

## 2023-01-19 DIAGNOSIS — J06.9 UPPER RESPIRATORY TRACT INFECTION, UNSPECIFIED TYPE: Primary | ICD-10-CM

## 2023-01-19 DIAGNOSIS — R05.8 PRODUCTIVE COUGH: ICD-10-CM

## 2023-01-19 LAB
EXPIRATION DATE: NORMAL
FLUAV AG UPPER RESP QL IA.RAPID: NOT DETECTED
FLUBV AG UPPER RESP QL IA.RAPID: NOT DETECTED
INTERNAL CONTROL: NORMAL
Lab: NORMAL
SARS-COV-2 AG UPPER RESP QL IA.RAPID: NOT DETECTED

## 2023-01-19 PROCEDURE — 99213 OFFICE O/P EST LOW 20 MIN: CPT

## 2023-01-19 PROCEDURE — 87428 SARSCOV & INF VIR A&B AG IA: CPT

## 2023-01-19 RX ORDER — AZITHROMYCIN 250 MG/1
TABLET, FILM COATED ORAL
Qty: 6 TABLET | Refills: 0 | Status: SHIPPED | OUTPATIENT
Start: 2023-01-19 | End: 2023-01-24

## 2023-01-19 RX ORDER — DEXTROMETHORPHAN HYDROBROMIDE AND PROMETHAZINE HYDROCHLORIDE 15; 6.25 MG/5ML; MG/5ML
5 SYRUP ORAL 4 TIMES DAILY PRN
Qty: 118 ML | Refills: 0 | Status: SHIPPED | OUTPATIENT
Start: 2023-01-19 | End: 2023-03-21

## 2023-01-19 NOTE — LETTER
January 19, 2023     Patient: Lyndsay Garcia   YOB: 1965   Date of Visit: 1/19/2023       To Whom It May Concern:    Lyndsay Garcia was seen in my office today.  It is my medical opinion that she may return to work tomorrow.       Sincerely,        MELANIE Calderon    CC: No Recipients

## 2023-01-19 NOTE — PROGRESS NOTES
"Chief Complaint  Sinus Congestin, Cough, and Generalized Body Aches    Subjective          History of Present Illness    Lyndsay Garcia 57 y.o. female presents today for a new patient appointment.  She is here to establish care, is here for their URI symptoms and is a new patient to me. I reviewed the PFSH recorded today by my MA/LPN staff.       She reports congestion, cough described as productive of green sputum, and myalgias.  Onset of symptoms was 3 days ago, gradually worsening since that time. Patient denies shortness of breath, wheezing, hemoptysis, pleuritic chest pain, dyspnea on exertion, ear drainage, eye discharge, diarrhea, vomiting, vertigo, sneezing, chills, sweats, sinus pain, epistaxis, high fever, joint pain.   Evaluation to date: none Treatment to date: Prednisone 10 mg (1 tablet).     She is a current daily smoker.  She has smoked 1PPD for 30 years.  She is not interested in smoking cessation.    Review of Systems   Constitutional: Negative for chills, fatigue and fever.   HENT: Positive for congestion. Negative for ear pain, facial swelling, sinus pressure, sore throat and trouble swallowing.    Eyes: Negative for visual disturbance.   Respiratory: Positive for cough (productive). Negative for apnea, chest tightness, shortness of breath and wheezing.    Cardiovascular: Negative for chest pain and palpitations.   Gastrointestinal: Negative for abdominal pain, constipation, diarrhea, nausea and vomiting.   Genitourinary: Negative for dysuria, frequency and urgency.   Musculoskeletal: Positive for myalgias. Negative for back pain.   Skin: Negative for rash.   Neurological: Negative for dizziness, syncope and light-headedness.   Psychiatric/Behavioral: Negative for behavioral problems and sleep disturbance.        Objective   Vital Signs:   /68   Pulse 57   Temp 97.3 °F (36.3 °C)   Resp 16   Ht 162.6 cm (64\")   Wt 54 kg (119 lb)   SpO2 96%   BMI 20.43 kg/m²      BMI is within normal " parameters. No other follow-up for BMI required.        Physical Exam  Vitals and nursing note reviewed.   Constitutional:       General: She is not in acute distress.     Appearance: Normal appearance. She is well-developed. She is not toxic-appearing or diaphoretic.   HENT:      Head: Normocephalic and atraumatic. Hair is normal.      Right Ear: External ear normal. No drainage, swelling or tenderness. Tympanic membrane is not erythematous or retracted.      Left Ear: External ear normal. No drainage, swelling or tenderness. Tympanic membrane is not erythematous or retracted.      Nose: Congestion present. No mucosal edema or rhinorrhea.      Right Sinus: No maxillary sinus tenderness or frontal sinus tenderness.      Left Sinus: No maxillary sinus tenderness or frontal sinus tenderness.      Mouth/Throat:      Mouth: Mucous membranes are moist. No oral lesions.      Pharynx: Uvula midline. Posterior oropharyngeal erythema present. No pharyngeal swelling, oropharyngeal exudate or uvula swelling.      Tonsils: No tonsillar exudate or tonsillar abscesses.   Eyes:      General: No scleral icterus.        Right eye: No discharge.         Left eye: No discharge.      Conjunctiva/sclera: Conjunctivae normal.      Pupils: Pupils are equal, round, and reactive to light.   Cardiovascular:      Rate and Rhythm: Normal rate and regular rhythm.      Pulses: Normal pulses.      Heart sounds: Normal heart sounds. No murmur heard.    No gallop.   Pulmonary:      Effort: No respiratory distress.      Breath sounds: No stridor or decreased air movement. Examination of the right-lower field reveals wheezing. Wheezing present. No decreased breath sounds, rhonchi or rales.   Chest:      Chest wall: No tenderness.   Abdominal:      Palpations: Abdomen is soft.      Tenderness: There is no abdominal tenderness.   Musculoskeletal:      Cervical back: Normal range of motion and neck supple.   Lymphadenopathy:      Cervical: No cervical  adenopathy.   Skin:     General: Skin is warm and dry.      Findings: No rash.   Neurological:      Mental Status: She is alert and oriented to person, place, and time.      Motor: No abnormal muscle tone.   Psychiatric:         Behavior: Behavior normal.         Thought Content: Thought content normal.         Judgment: Judgment normal.                     Assessment and Plan      Diagnoses and all orders for this visit:    1. Upper respiratory tract infection, unspecified type (Primary)  -     azithromycin (Zithromax Z-Abdirizak) 250 MG tablet; Take 2 tablets the first day, then 1 tablet daily for 4 days.  Dispense: 6 tablet; Refill: 0  -     promethazine-dextromethorphan (PROMETHAZINE-DM) 6.25-15 MG/5ML syrup; Take 5 mL by mouth 4 (Four) Times a Day As Needed for Cough.  Dispense: 118 mL; Refill: 0    2. Productive cough  -     azithromycin (Zithromax Z-Abdirizak) 250 MG tablet; Take 2 tablets the first day, then 1 tablet daily for 4 days.  Dispense: 6 tablet; Refill: 0  -     promethazine-dextromethorphan (PROMETHAZINE-DM) 6.25-15 MG/5ML syrup; Take 5 mL by mouth 4 (Four) Times a Day As Needed for Cough.  Dispense: 118 mL; Refill: 0    3. Wheezes  -     azithromycin (Zithromax Z-Abdirizak) 250 MG tablet; Take 2 tablets the first day, then 1 tablet daily for 4 days.  Dispense: 6 tablet; Refill: 0            Follow Up     Return if symptoms worsen or fail to improve.    Patient was given instructions and counseling regarding her condition or for health maintenance advice. Please see specific information pulled into the AVS if appropriate.     -Take medication as prescribed.  Do not take Promethazine DM with muscle relaxer.  -Consider smoking cessation.  -Covid and Influenza testing were all negative today.  -Monitor for fever and take Tylenol as needed.  Drink plenty of fluids and get plenty of rest.  -Use cool-mist humidifier as needed.  -Seek immediate medical attention for fever unrelieved by Tylenol, chest pain, shortness of  breath, decreased oxygen saturations, sharp pain with breathing, or any other worsening signs or symptoms.  -Return to the office is symptoms worsen or do not improve.  -Return when feeling better for update labs.

## 2023-02-20 ENCOUNTER — OFFICE VISIT (OUTPATIENT)
Dept: CARDIOLOGY | Facility: CLINIC | Age: 58
End: 2023-02-20
Payer: COMMERCIAL

## 2023-02-20 VITALS
BODY MASS INDEX: 20.69 KG/M2 | WEIGHT: 121.2 LBS | HEIGHT: 64 IN | HEART RATE: 75 BPM | SYSTOLIC BLOOD PRESSURE: 112 MMHG | DIASTOLIC BLOOD PRESSURE: 72 MMHG

## 2023-02-20 DIAGNOSIS — R06.09 DOE (DYSPNEA ON EXERTION): ICD-10-CM

## 2023-02-20 DIAGNOSIS — Z72.0 TOBACCO USE: ICD-10-CM

## 2023-02-20 DIAGNOSIS — I49.3 PVC (PREMATURE VENTRICULAR CONTRACTION): Primary | ICD-10-CM

## 2023-02-20 PROCEDURE — 93000 ELECTROCARDIOGRAM COMPLETE: CPT | Performed by: INTERNAL MEDICINE

## 2023-02-20 PROCEDURE — 99214 OFFICE O/P EST MOD 30 MIN: CPT | Performed by: INTERNAL MEDICINE

## 2023-02-20 NOTE — PROGRESS NOTES
[facility] Follow Up Office Note     Encounter Date:23  Patient:Lyndsay Garcia  :1965  MRN:[id number]      Chief Complaint:   Chief Complaint   Patient presents with   • Follow-up     PVC       History of Presenting Illness:      Ms. Garcia is a 57 y.o. woman with past medical history notable for PVC, asthma and tobacco abuse who presents her office for scheduled follow-up.  Overall patient doing about the same.  She seems to be tolerating atenolol and minimal symptoms of her PVCs.      Review of Systems:  Review of Systems   Constitutional: Positive for malaise/fatigue.   HENT: Negative.    Eyes: Negative.    Cardiovascular: Positive for dyspnea on exertion and palpitations.   Respiratory: Positive for shortness of breath.    Endocrine: Negative.    Hematologic/Lymphatic: Negative.    Skin: Negative.    Musculoskeletal: Negative.    Gastrointestinal: Negative.    Genitourinary: Negative.    Neurological: Negative.    Psychiatric/Behavioral: Negative.    Allergic/Immunologic: Negative.        Current Outpatient Medications on File Prior to Visit   Medication Sig Dispense Refill   • albuterol sulfate  (90 Base) MCG/ACT inhaler Inhale 2 puffs Every 6 (Six) Hours As Needed for Wheezing or Shortness of Air. 18 g 3   • atenolol (TENORMIN) 25 MG tablet Take 1 tablet by mouth Daily. 90 tablet 3   • Glycopyrrolate-Formoterol (Bevespi Aerosphere) 9-4.8 MCG/ACT aerosol Inhale 2 sprays 2 (Two) Times a Day. 10.7 g 3   • levocetirizine (XYZAL) 5 MG tablet Take 1 tablet by mouth Every Evening. 90 tablet 3   • Thyroid (ARMOUR THYROID) 60 MG PO tablet Take 1 tablet by mouth Daily. 90 tablet 3   • tiZANidine (ZANAFLEX) 4 MG tablet Take 1 tablet by mouth At Night As Needed for Muscle Spasms. 30 tablet 3   • vitamin B-12 (CYANOCOBALAMIN) 1000 MCG tablet Take 1,000 mcg by mouth Daily.     • VITAMIN D PO Take 5,000 Units by mouth Daily.     • promethazine-dextromethorphan (PROMETHAZINE-DM) 6.25-15 MG/5ML  "syrup Take 5 mL by mouth 4 (Four) Times a Day As Needed for Cough. 118 mL 0   • [DISCONTINUED] montelukast (SINGULAIR) 10 MG tablet Take 1 tablet by mouth Every Night. 90 tablet 3     No current facility-administered medications on file prior to visit.       Allergies   Allergen Reactions   • Sulfa Antibiotics Itching       Past Medical History:   Diagnosis Date   • Abdominal pain    • Abnormal blood chemistry    • Abnormal Pap smear of cervix    • Acute maxillary sinusitis    • Allergic rhinitis    • Chronic constipation    • Chronic obstructive pulmonary disease (HCC)    • Constipation    • Fatigue    • Hematuria    • Hypothyroidism    • IBS (irritable bowel syndrome)    • Increased abdominal girth    • Migraine with aura, intractable    • Neck pain    • Perimenopause    • Vitamin D deficiency        Past Surgical History:   Procedure Laterality Date   • CATARACT EXTRACTION, BILATERAL  12/2018       Social History     Socioeconomic History   • Marital status: Unknown   Tobacco Use   • Smoking status: Every Day     Packs/day: 0.50     Years: 40.00     Pack years: 20.00     Types: Cigarettes   • Smokeless tobacco: Never   Substance and Sexual Activity   • Alcohol use: No     Comment: caffeine use   • Drug use: No   • Sexual activity: Yes     Partners: Male     Birth control/protection: None       Family History   Problem Relation Age of Onset   • Diabetes Mother    • Heart disease Mother    • Hypertension Mother    • Cancer Father    • Diabetes Sister    • Diabetes Sister    • Kidney disease Sister        The following portions of the patient's history were reviewed and updated as appropriate: allergies, current medications, past family history, past medical history, past social history, past surgical history and problem list.       Objective:       Vitals:    02/20/23 1025   BP: 112/72   BP Location: Right arm   Patient Position: Sitting   Pulse: 75   Weight: 55 kg (121 lb 3.2 oz)   Height: 162.6 cm (64\") "       Body mass index is 20.8 kg/m².    Physical Exam:  Constitutional: Well appearing, Well-developed, No acute distress   HENT: Oropharynx clear and membrane moist  Eyes: Normal conjunctiva, no sclera icterus.  Neck: Supple, no carotid bruit bilaterally.  Cardiovascular: Regular rate and rhythm, No Murmur, No bilateral lower extremity edema.  Pulmonary: Normal respiratory effort, Normal lung sounds, no wheezing.  Abdominal: Soft, nontender, no hepatosplenomegaly, liver is non-pulsatile.  Neurological: Alert and orient x 3.   Skin: Warm, dry, no ecchymosis, no rash.  Psych: Appropriate mood and affect. Normal judgment and insight.      Lab Results   Component Value Date     08/08/2022     08/13/2021    K 4.6 08/08/2022    K 4.7 08/13/2021     08/08/2022     08/13/2021    CO2 26 08/08/2022    CO2 27.9 08/13/2021    BUN 8 08/08/2022    BUN 5 (L) 08/13/2021    CREATININE 0.67 08/08/2022    CREATININE 0.64 08/13/2021    EGFRIFNONA 97 08/13/2021    EGFRIFNONA 82 02/12/2021    EGFRIFAFRI 117 08/13/2021    EGFRIFAFRI 100 02/12/2021    GLUCOSE 99 08/08/2022    GLUCOSE 92 08/13/2021    CALCIUM 9.8 08/08/2022    CALCIUM 10.2 08/13/2021    PROTENTOTREF 7.6 08/08/2022    PROTENTOTREF 7.5 08/13/2021    ALBUMIN 4.6 08/08/2022    ALBUMIN 4.50 08/13/2021    BILITOT 0.4 08/08/2022    BILITOT 0.4 08/13/2021    AST 17 08/08/2022    AST 17 08/13/2021    ALT 13 08/08/2022    ALT 15 08/13/2021     Lab Results   Component Value Date    WBC 7.8 08/08/2022    WBC 6.12 08/13/2021    HGB 14.7 08/08/2022    HGB 15.6 08/13/2021    HCT 44.4 08/08/2022    HCT 47.4 (H) 08/13/2021    MCV 86 08/08/2022    MCV 86.8 08/13/2021     08/08/2022     08/13/2021     Lab Results   Component Value Date    TRIG 129 08/08/2022    TRIG 155 (H) 08/13/2021    HDL 37 (L) 08/08/2022    HDL 37 (L) 08/13/2021    LDL 89 08/08/2022    LDL 84 08/13/2021     The 10-year ASCVD risk score (Nikki MCDANIELS, et al., 2019) is: 4%    Values used  to calculate the score:      Age: 57 years      Sex: Female      Is Non- : No      Diabetic: No      Tobacco smoker: Yes      Systolic Blood Pressure: 112 mmHg      Is BP treated: No      HDL Cholesterol: 37 mg/dL      Total Cholesterol: 149 mg/dL     No results found for: PROBNP, BNP  No results found for: CKTOTAL, CKMB, CKMBINDEX, TROPONINI, TROPONINT  Lab Results   Component Value Date    TSH 4.090 08/08/2022    TSH 4.510 (H) 02/07/2022         ECG 12 Lead    Date/Time: 2/20/2023 10:52 AM  Performed by: Elia Walters MD  Authorized by: Elia Walters MD   Comparison: compared with previous ECG from 10/31/2022  Similar to previous ECG  Rhythm: sinus rhythm  Ectopy: unifocal PVCs        Holter monitor 9/14/2022:  · A relatively benign monitor study.  · Underlying heart rhythm was sinus rhythm with an average heart rate of 69 bpm and a heart rate range of 45 bpm up to 111 bpm  · 4.3% burden of premature ventricular contractions  · Patient triggered events correlated with underlying sinus rhythm.    Echocardiogram 9/6/2022:  · Left ventricular ejection fraction appears to be 61 - 65%.  · Left ventricular diastolic function was normal.  · Normal right ventricular cavity size and systolic function noted.  · Normal left atrial cavity size noted  · Interatrial septal aneurysm present.  · Calculated right ventricular systolic pressure from tricuspid regurgitation is 19 mmHg.  · There is a trivial pericardial effusion.          Assessment:          Diagnosis Plan   1. PVC (premature ventricular contraction)  ECG 12 Lead      2. Tobacco use        3. ROSE (dyspnea on exertion)               Plan:       Ms. Garcia is a 57 y.o. woman with past medical history notable for asthma, PVCs, and tobacco abuse who presents for scheduled follow-up.  Overall she is doing about the same.  We will continue with atenolol as tolerated.  She is unfortunately not quite ready to quit smoking at this time which I  think would help out a lot.  Again discussed consideration of seeing a pulmonologist or stress testing but she like to defer again.  We will plan on seeing her back in 1 year unless any changes occur for which she can see us sooner      Dyspnea on exertion:  · Patient declined pulmonary referral  · Patient like to hold off on stress test at this time  · She will work on trying to think about quitting smoking    PVCs:  · Continue atenolol  · Echocardiogram 9/2022 with structurally normal heart  · Holter monitor 9/2022 demonstrates a 4% burden of premature ventricular contraction   · CMP and TSH 8/2022 within normal limits    Tobacco abuse:  · Patient not interested in quitting smoking at this time      Follow Up:  12 Months      Thank you for allowing me to participate in the care of Lyndsay Garcia. Feel free to contact me directly with any further questions or concerns.    Elia Walters MD  [facility]  02/20/23  10:56 EST

## 2023-03-06 ENCOUNTER — OFFICE VISIT (OUTPATIENT)
Dept: FAMILY MEDICINE CLINIC | Facility: CLINIC | Age: 58
End: 2023-03-06
Payer: COMMERCIAL

## 2023-03-06 VITALS
TEMPERATURE: 97 F | BODY MASS INDEX: 20.22 KG/M2 | HEIGHT: 64 IN | DIASTOLIC BLOOD PRESSURE: 60 MMHG | OXYGEN SATURATION: 92 % | RESPIRATION RATE: 16 BRPM | HEART RATE: 78 BPM | SYSTOLIC BLOOD PRESSURE: 110 MMHG | WEIGHT: 118.4 LBS

## 2023-03-06 DIAGNOSIS — R05.8 PRODUCTIVE COUGH: ICD-10-CM

## 2023-03-06 DIAGNOSIS — J06.9 UPPER RESPIRATORY TRACT INFECTION, UNSPECIFIED TYPE: Primary | ICD-10-CM

## 2023-03-06 DIAGNOSIS — R06.02 SHORTNESS OF BREATH: ICD-10-CM

## 2023-03-06 LAB
EXPIRATION DATE: NORMAL
EXPIRATION DATE: NORMAL
FLUAV AG NPH QL: NEGATIVE
FLUBV AG NPH QL: NEGATIVE
INTERNAL CONTROL: NORMAL
INTERNAL CONTROL: NORMAL
Lab: NORMAL
Lab: NORMAL
SARS-COV-2 AG UPPER RESP QL IA.RAPID: NOT DETECTED

## 2023-03-06 PROCEDURE — 87426 SARSCOV CORONAVIRUS AG IA: CPT

## 2023-03-06 PROCEDURE — 87804 INFLUENZA ASSAY W/OPTIC: CPT

## 2023-03-06 PROCEDURE — 99213 OFFICE O/P EST LOW 20 MIN: CPT

## 2023-03-06 RX ORDER — METHYLPREDNISOLONE 4 MG/1
TABLET ORAL
Qty: 21 EACH | Refills: 0 | Status: SHIPPED | OUTPATIENT
Start: 2023-03-06 | End: 2023-03-21

## 2023-03-06 RX ORDER — BENZONATATE 200 MG/1
200 CAPSULE ORAL 3 TIMES DAILY PRN
Qty: 30 CAPSULE | Refills: 1 | Status: SHIPPED | OUTPATIENT
Start: 2023-03-06 | End: 2023-03-21

## 2023-03-06 RX ORDER — AZITHROMYCIN 250 MG/1
TABLET, FILM COATED ORAL
Qty: 6 TABLET | Refills: 0 | Status: SHIPPED | OUTPATIENT
Start: 2023-03-06 | End: 2023-03-11

## 2023-03-06 NOTE — PROGRESS NOTES
"Chief Complaint  Headache, URI, Cough, Shortness of Breath, and Generalized Body Aches    Subjective          History of Present Illness    Lyndsay Garcia 57 y.o. female presents for evaluation of upper respiratory congestion, and cough. Symptoms include congestion, cough described as productive of green sputum, chills, myalgias, headache, and shortness of breath.  Onset of symptoms was 2 days ago, gradually worsening since that time. Patient denies hemoptysis, pleuritic chest pain, fever, ear drainage, eye discharge, diarrhea, vomiting, vertigo, sneezing, sweats, sinus pain, epistaxis, high fever, joint pain.   Evaluation to date: none Treatment to date:  Bevespi inhaler and Promethazine DM cough syrup.  She has not used her Albuterol inhaler.      Review of Systems   Constitutional: Positive for chills. Negative for fatigue and fever.   HENT: Positive for congestion. Negative for ear pain, sinus pressure and sore throat.    Eyes: Negative for visual disturbance.   Respiratory: Positive for cough and shortness of breath. Negative for chest tightness and wheezing.    Cardiovascular: Negative for chest pain and palpitations.   Gastrointestinal: Negative for abdominal pain, constipation, diarrhea, nausea and vomiting.   Genitourinary: Negative for dysuria, frequency and urgency.   Musculoskeletal: Positive for myalgias. Negative for back pain.   Skin: Negative for rash.   Neurological: Negative for dizziness, syncope and light-headedness.   Psychiatric/Behavioral: Negative for behavioral problems and sleep disturbance.        Objective   Vital Signs:   /60 (BP Location: Left arm, Patient Position: Sitting, Cuff Size: Adult)   Pulse 78   Temp 97 °F (36.1 °C) (Oral)   Resp 16   Ht 162.6 cm (64.02\")   Wt 53.7 kg (118 lb 6.4 oz)   SpO2 92%   BMI 20.31 kg/m²      BMI is within normal parameters. No other follow-up for BMI required.        Physical Exam  Vitals and nursing note reviewed.   Constitutional:       " General: She is not in acute distress.     Appearance: Normal appearance. She is well-developed. She is not toxic-appearing or diaphoretic.   HENT:      Head: Normocephalic and atraumatic. Hair is normal.      Right Ear: External ear normal. No drainage, swelling or tenderness. Tympanic membrane is not erythematous or retracted.      Left Ear: External ear normal. No drainage, swelling or tenderness. Tympanic membrane is not erythematous or retracted.      Nose: No mucosal edema, congestion or rhinorrhea.      Mouth/Throat:      Mouth: Mucous membranes are moist. No oral lesions.      Pharynx: Uvula midline. Posterior oropharyngeal erythema present. No pharyngeal swelling, oropharyngeal exudate or uvula swelling.      Tonsils: No tonsillar exudate or tonsillar abscesses.   Eyes:      General: No scleral icterus.        Right eye: No discharge.         Left eye: No discharge.      Conjunctiva/sclera: Conjunctivae normal.      Pupils: Pupils are equal, round, and reactive to light.   Cardiovascular:      Rate and Rhythm: Normal rate and regular rhythm.      Heart sounds: Normal heart sounds. No murmur heard.    No gallop.   Pulmonary:      Effort: No respiratory distress.      Breath sounds: No stridor or decreased air movement. Wheezing present. No decreased breath sounds, rhonchi or rales.   Chest:      Chest wall: No tenderness.   Abdominal:      Palpations: Abdomen is soft.      Tenderness: There is no abdominal tenderness.   Musculoskeletal:      Cervical back: Normal range of motion and neck supple.   Lymphadenopathy:      Cervical: No cervical adenopathy.   Skin:     General: Skin is warm and dry.      Findings: No rash.   Neurological:      Mental Status: She is alert and oriented to person, place, and time.      Motor: No abnormal muscle tone.   Psychiatric:         Behavior: Behavior normal.         Thought Content: Thought content normal.         Judgment: Judgment normal.          The following data was  reviewed by: MELANIE Calderon on 03/06/2023:  POCT Influenza A/B (03/06/2023 13:14)  POCT SARS-CoV-2 Antigen JAMAL (03/06/2023 13:13)               Assessment and Plan      Diagnoses and all orders for this visit:    1. Upper respiratory tract infection, unspecified type (Primary)  -     azithromycin (Zithromax Z-Abdirizak) 250 MG tablet; Take 2 tablets the first day, then 1 tablet daily for 4 days.  Dispense: 6 tablet; Refill: 0  -     methylPREDNISolone (MEDROL) 4 MG dose pack; Take as directed on package instructions.  Dispense: 21 each; Refill: 0  -     benzonatate (TESSALON) 200 MG capsule; Take 1 capsule by mouth 3 (Three) Times a Day As Needed for Cough.  Dispense: 30 capsule; Refill: 1    2. Productive cough  -     POCT SARS-CoV-2 Antigen JAMAL  -     POCT Influenza A/B  -     azithromycin (Zithromax Z-Abdirizak) 250 MG tablet; Take 2 tablets the first day, then 1 tablet daily for 4 days.  Dispense: 6 tablet; Refill: 0  -     methylPREDNISolone (MEDROL) 4 MG dose pack; Take as directed on package instructions.  Dispense: 21 each; Refill: 0  -     benzonatate (TESSALON) 200 MG capsule; Take 1 capsule by mouth 3 (Three) Times a Day As Needed for Cough.  Dispense: 30 capsule; Refill: 1    3. Shortness of breath  -     POCT SARS-CoV-2 Antigen JAMAL  -     POCT Influenza A/B  -     azithromycin (Zithromax Z-Abdirizak) 250 MG tablet; Take 2 tablets the first day, then 1 tablet daily for 4 days.  Dispense: 6 tablet; Refill: 0  -     methylPREDNISolone (MEDROL) 4 MG dose pack; Take as directed on package instructions.  Dispense: 21 each; Refill: 0  -     benzonatate (TESSALON) 200 MG capsule; Take 1 capsule by mouth 3 (Three) Times a Day As Needed for Cough.  Dispense: 30 capsule; Refill: 1            Follow Up     Return if symptoms worsen or fail to improve.    Patient was given instructions and counseling regarding her condition or for health maintenance advice. Please see specific information pulled into the AVS if appropriate.      -Take medication as prescribed.  Use Albuterol inhaler as needed.  -Covid and Influenza testing are all negative today.  -Monitor for fever and take Tylenol as needed.  Drink plenty of fluids and get plenty of rest.  -Use cool-mist humidifier as needed.  -Seek immediate medical attention for fever unrelieved by Tylenol, chest pain, shortness of breath, decreased oxygen saturations, sharp pain with breathing, or any other worsening signs or symptoms.  -Return to the office is symptoms worsen or do not improve.

## 2023-03-09 ENCOUNTER — HOSPITAL ENCOUNTER (EMERGENCY)
Facility: HOSPITAL | Age: 58
Discharge: HOME | End: 2023-03-09
Attending: EMERGENCY MEDICINE | Admitting: EMERGENCY MEDICINE
Payer: COMMERCIAL

## 2023-03-09 ENCOUNTER — APPOINTMENT (OUTPATIENT)
Dept: GENERAL RADIOLOGY | Facility: HOSPITAL | Age: 58
End: 2023-03-09
Payer: COMMERCIAL

## 2023-03-09 VITALS
HEIGHT: 63 IN | RESPIRATION RATE: 18 BRPM | OXYGEN SATURATION: 96 % | DIASTOLIC BLOOD PRESSURE: 71 MMHG | WEIGHT: 118 LBS | BODY MASS INDEX: 20.91 KG/M2 | SYSTOLIC BLOOD PRESSURE: 116 MMHG | TEMPERATURE: 98.6 F | HEART RATE: 61 BPM

## 2023-03-09 DIAGNOSIS — R06.02 SHORTNESS OF BREATH: ICD-10-CM

## 2023-03-09 DIAGNOSIS — Z72.0 TOBACCO ABUSE: ICD-10-CM

## 2023-03-09 DIAGNOSIS — J44.1 COPD WITH EXACERBATION: ICD-10-CM

## 2023-03-09 DIAGNOSIS — J44.1 COPD WITH ACUTE EXACERBATION: Primary | ICD-10-CM

## 2023-03-09 LAB
ALBUMIN SERPL-MCNC: 4.3 G/DL (ref 3.5–5.2)
ALBUMIN/GLOB SERPL: 1.3 G/DL
ALP SERPL-CCNC: 127 U/L (ref 39–117)
ALT SERPL W P-5'-P-CCNC: 38 U/L (ref 1–33)
ANION GAP SERPL CALCULATED.3IONS-SCNC: 11 MMOL/L (ref 5–15)
AST SERPL-CCNC: 26 U/L (ref 1–32)
B PARAPERT DNA SPEC QL NAA+PROBE: NOT DETECTED
B PERT DNA SPEC QL NAA+PROBE: NOT DETECTED
BASOPHILS # BLD AUTO: 0.04 10*3/MM3 (ref 0–0.2)
BASOPHILS NFR BLD AUTO: 0.3 % (ref 0–1.5)
BILIRUB SERPL-MCNC: 0.2 MG/DL (ref 0–1.2)
BUN SERPL-MCNC: 9 MG/DL (ref 6–20)
BUN/CREAT SERPL: 14.3 (ref 7–25)
C PNEUM DNA NPH QL NAA+NON-PROBE: NOT DETECTED
CALCIUM SPEC-SCNC: 9.6 MG/DL (ref 8.6–10.5)
CHLORIDE SERPL-SCNC: 98 MMOL/L (ref 98–107)
CO2 SERPL-SCNC: 30 MMOL/L (ref 22–29)
CREAT SERPL-MCNC: 0.63 MG/DL (ref 0.57–1)
D-LACTATE SERPL-SCNC: 2.3 MMOL/L (ref 0.5–2)
DEPRECATED RDW RBC AUTO: 39.4 FL (ref 37–54)
EGFRCR SERPLBLD CKD-EPI 2021: 104.9 ML/MIN/1.73
EOSINOPHIL # BLD AUTO: 0 10*3/MM3 (ref 0–0.4)
EOSINOPHIL NFR BLD AUTO: 0 % (ref 0.3–6.2)
ERYTHROCYTE [DISTWIDTH] IN BLOOD BY AUTOMATED COUNT: 12.8 % (ref 12.3–15.4)
FLUAV SUBTYP SPEC NAA+PROBE: NOT DETECTED
FLUBV RNA ISLT QL NAA+PROBE: NOT DETECTED
GLOBULIN UR ELPH-MCNC: 3.4 GM/DL
GLUCOSE SERPL-MCNC: 173 MG/DL (ref 65–99)
HADV DNA SPEC NAA+PROBE: NOT DETECTED
HCOV 229E RNA SPEC QL NAA+PROBE: NOT DETECTED
HCOV HKU1 RNA SPEC QL NAA+PROBE: NOT DETECTED
HCOV NL63 RNA SPEC QL NAA+PROBE: NOT DETECTED
HCOV OC43 RNA SPEC QL NAA+PROBE: NOT DETECTED
HCT VFR BLD AUTO: 40.7 % (ref 34–46.6)
HGB BLD-MCNC: 13.7 G/DL (ref 12–15.9)
HMPV RNA NPH QL NAA+NON-PROBE: NOT DETECTED
HPIV1 RNA ISLT QL NAA+PROBE: NOT DETECTED
HPIV2 RNA SPEC QL NAA+PROBE: NOT DETECTED
HPIV3 RNA NPH QL NAA+PROBE: NOT DETECTED
HPIV4 P GENE NPH QL NAA+PROBE: NOT DETECTED
IMM GRANULOCYTES # BLD AUTO: 0.1 10*3/MM3 (ref 0–0.05)
IMM GRANULOCYTES NFR BLD AUTO: 0.6 % (ref 0–0.5)
LYMPHOCYTES # BLD AUTO: 1.41 10*3/MM3 (ref 0.7–3.1)
LYMPHOCYTES NFR BLD AUTO: 9.1 % (ref 19.6–45.3)
M PNEUMO IGG SER IA-ACNC: NOT DETECTED
MCH RBC QN AUTO: 28.2 PG (ref 26.6–33)
MCHC RBC AUTO-ENTMCNC: 33.7 G/DL (ref 31.5–35.7)
MCV RBC AUTO: 83.9 FL (ref 79–97)
MONOCYTES # BLD AUTO: 0.89 10*3/MM3 (ref 0.1–0.9)
MONOCYTES NFR BLD AUTO: 5.7 % (ref 5–12)
NEUTROPHILS NFR BLD AUTO: 13.14 10*3/MM3 (ref 1.7–7)
NEUTROPHILS NFR BLD AUTO: 84.3 % (ref 42.7–76)
NRBC BLD AUTO-RTO: 0 /100 WBC (ref 0–0.2)
NT-PROBNP SERPL-MCNC: 430 PG/ML (ref 0–900)
PLATELET # BLD AUTO: 415 10*3/MM3 (ref 140–450)
PMV BLD AUTO: 9.1 FL (ref 6–12)
POTASSIUM SERPL-SCNC: 3.8 MMOL/L (ref 3.5–5.2)
PROCALCITONIN SERPL-MCNC: 0.06 NG/ML (ref 0–0.25)
PROT SERPL-MCNC: 7.7 G/DL (ref 6–8.5)
RBC # BLD AUTO: 4.85 10*6/MM3 (ref 3.77–5.28)
RHINOVIRUS RNA SPEC NAA+PROBE: NOT DETECTED
RSV RNA NPH QL NAA+NON-PROBE: NOT DETECTED
SARS-COV-2 RNA NPH QL NAA+NON-PROBE: NOT DETECTED
SODIUM SERPL-SCNC: 139 MMOL/L (ref 136–145)
TROPONIN T SERPL HS-MCNC: 6 NG/L
WBC NRBC COR # BLD: 15.58 10*3/MM3 (ref 3.4–10.8)

## 2023-03-09 PROCEDURE — 84484 ASSAY OF TROPONIN QUANT: CPT | Performed by: EMERGENCY MEDICINE

## 2023-03-09 PROCEDURE — 85025 COMPLETE CBC W/AUTO DIFF WBC: CPT | Performed by: EMERGENCY MEDICINE

## 2023-03-09 PROCEDURE — 71046 X-RAY EXAM CHEST 2 VIEWS: CPT

## 2023-03-09 PROCEDURE — 99283 EMERGENCY DEPT VISIT LOW MDM: CPT

## 2023-03-09 PROCEDURE — 94799 UNLISTED PULMONARY SVC/PX: CPT

## 2023-03-09 PROCEDURE — 0202U NFCT DS 22 TRGT SARS-COV-2: CPT | Performed by: EMERGENCY MEDICINE

## 2023-03-09 PROCEDURE — 83880 ASSAY OF NATRIURETIC PEPTIDE: CPT | Performed by: EMERGENCY MEDICINE

## 2023-03-09 PROCEDURE — 94640 AIRWAY INHALATION TREATMENT: CPT

## 2023-03-09 PROCEDURE — 83605 ASSAY OF LACTIC ACID: CPT | Performed by: EMERGENCY MEDICINE

## 2023-03-09 PROCEDURE — 80053 COMPREHEN METABOLIC PANEL: CPT | Performed by: EMERGENCY MEDICINE

## 2023-03-09 PROCEDURE — 84145 PROCALCITONIN (PCT): CPT | Performed by: EMERGENCY MEDICINE

## 2023-03-09 PROCEDURE — 36415 COLL VENOUS BLD VENIPUNCTURE: CPT

## 2023-03-09 RX ORDER — ALBUTEROL SULFATE 90 UG/1
2 AEROSOL, METERED RESPIRATORY (INHALATION) EVERY 6 HOURS PRN
Qty: 18 G | Refills: 3 | Status: SHIPPED | OUTPATIENT
Start: 2023-03-09

## 2023-03-09 RX ORDER — IPRATROPIUM BROMIDE AND ALBUTEROL SULFATE 2.5; .5 MG/3ML; MG/3ML
3 SOLUTION RESPIRATORY (INHALATION) ONCE
Status: COMPLETED | OUTPATIENT
Start: 2023-03-09 | End: 2023-03-09

## 2023-03-09 RX ORDER — GLYCOPYRROLATE AND FORMOTEROL FUMARATE 9; 4.8 UG/1; UG/1
2 AEROSOL, METERED RESPIRATORY (INHALATION) 2 TIMES DAILY
Qty: 10.7 G | Refills: 3 | Status: SHIPPED | OUTPATIENT
Start: 2023-03-09

## 2023-03-09 RX ADMIN — IPRATROPIUM BROMIDE AND ALBUTEROL SULFATE 3 ML: .5; 2.5 SOLUTION RESPIRATORY (INHALATION) at 20:34

## 2023-03-09 NOTE — ED TRIAGE NOTES
Pt is soa and coughing up brown drainage.  She was seen by pmd Monday and was put on steroids and abx    Patient was placed in face mask during first look triage.  Patient was wearing a face mask throughout encounter.  I wore personal protective equipment throughout the encounter.  Hand hygiene was performed before and after patient encounter.

## 2023-03-09 NOTE — ED TRIAGE NOTES
Pt c/o cough, congestion, soa that started on Sunday. Pt was seen on Monday and placed on steroids and abx for possible URI. Pt states that symptoms are getting worse. Pt was neg for covid and flu at md office    This RN wore mask and goggles during time of contact

## 2023-03-10 NOTE — ED PROVIDER NOTES
EMERGENCY DEPARTMENT ENCOUNTER    Room Number:  23/23  Date seen:  3/9/2023  PCP: Kristyn Gunderson APRN  Historian: Patient      HPI:  Chief Complaint: Cough, congestion trouble breathing    A complete HPI/ROS/PMH/PSH/SH/FH are unobtainable due to: N/A    Context: Lyndsay Garcia is a 57 y.o. female who presents to the ED c/o cough, congestion and trouble breathing since Sunday.  She has had a subjective low-grade fever.  Cough is productive of greenish-brown sputum.  Chest is sore from coughing.  She has myalgias and arthralgias and a headache.  No nausea, vomiting or diarrhea.  She had COVID in January 2022.  She has never been vaccinated for influenza or COVID.  She is a smoker        Additional sources:  - Discussed/ obtained information from independent historians: Yes- confirms pertinent aspects of the HPI    - External (non-ED) record review: Patient saw her primary care provider on 3/6/2023 and was diagnosed with an upper respiratory tract infection.  She was started on Zithromax and a Medrol Dosepak.  She was negative for COVID and flu at that time.    - Chronic or social conditions impacting care: She is a smoker, she has COPD, hypothyroidism and PVCs.      PAST MEDICAL HISTORY  Active Ambulatory Problems     Diagnosis Date Noted   • Hypothyroidism 12/06/2019   • Muscle spasms of neck 12/06/2019   • Vitamin D deficiency 02/12/2021   • Allergies 02/12/2021   • Wellness examination 02/12/2021   • Smoker 02/12/2021   • High triglycerides 08/13/2021   • Bronchitis 08/13/2021   • Wheezing 08/13/2021   • Cough 08/13/2021   • Tinea 08/13/2021   • COPD with exacerbation (HCC) 08/13/2021   • COVID-19 virus infection 02/07/2022   • Elevated triglycerides with high cholesterol 02/07/2022   • H/O multiple allergies 08/08/2022   • Shortness of breath 08/08/2022   • Sore throat 08/08/2022   • Tobacco use 08/15/2022   • PVC (premature ventricular contraction) 10/31/2022     Resolved Ambulatory Problems     Diagnosis  Date Noted   • No Resolved Ambulatory Problems     Past Medical History:   Diagnosis Date   • Abdominal pain    • Abnormal blood chemistry    • Abnormal Pap smear of cervix    • Acute maxillary sinusitis    • Allergic rhinitis    • Chronic constipation    • Chronic obstructive pulmonary disease (HCC)    • Constipation    • Fatigue    • Hematuria    • IBS (irritable bowel syndrome)    • Increased abdominal girth    • Migraine with aura, intractable    • Neck pain    • Perimenopause          PAST SURGICAL HISTORY  Past Surgical History:   Procedure Laterality Date   • CATARACT EXTRACTION, BILATERAL  12/2018         FAMILY HISTORY  Family History   Problem Relation Age of Onset   • Diabetes Mother    • Heart disease Mother    • Hypertension Mother    • Cancer Father    • Diabetes Sister    • Diabetes Sister    • Kidney disease Sister          SOCIAL HISTORY  Social History     Socioeconomic History   • Marital status: Unknown   Tobacco Use   • Smoking status: Every Day     Packs/day: 0.50     Years: 40.00     Pack years: 20.00     Types: Cigarettes   • Smokeless tobacco: Never   Substance and Sexual Activity   • Alcohol use: No     Comment: caffeine use   • Drug use: No   • Sexual activity: Yes     Partners: Male     Birth control/protection: None         ALLERGIES  Sulfa antibiotics        REVIEW OF SYSTEMS  Review of Systems   Respiratory: Positive for cough, chest tightness, shortness of breath and wheezing.    Cardiovascular: Positive for chest pain.   Gastrointestinal: Negative for abdominal pain, diarrhea, nausea and vomiting.   Musculoskeletal: Positive for arthralgias and myalgias.   Neurological: Positive for headaches.          PHYSICAL EXAM  ED Triage Vitals   Temp Heart Rate Resp BP SpO2   03/09/23 1618 03/09/23 1618 03/09/23 1618 03/09/23 1711 03/09/23 1618   98.6 °F (37 °C) 68 16 128/68 94 %      Temp src Heart Rate Source Patient Position BP Location FiO2 (%)   03/09/23 1618 03/09/23 1618 03/09/23 1711  03/09/23 1711 --   Tympanic Monitor Sitting Left arm        Physical Exam      Physical Exam   Constitutional: Pt. is oriented to person, place, and time.  She is well-developed, well-nourished, and in no distress.    HENT: Normocephalic and atraumatic. Pupils are equal, round, and reactive to light.   Neck: Normal range of motion. Neck supple. No JVD present. No tracheal deviation present.   Cardiovascular: Normal rate, regular rhythm and normal heart sounds.   Pulmonary/Chest: Effort normal and breath sounds normal. No stridor. No respiratory distress.  Mild/moderate diffuse wheezes, no rales.   Abdominal: Soft.  No distension. There is no tenderness. There is no rebound and no guarding.   Musculoskeletal: No edema, tenderness or deformity.   Neurological: She is alert and oriented to person, place, and time.  She has no focal neurologic deficits.  Skin: Skin is warm and dry. Pt. is not diaphoretic.  Psychiatric: Mood, affect normal.  She is pleasant and cooperative.  Nursing note and vitals reviewed.            LAB RESULTS  Recent Results (from the past 24 hour(s))   Comprehensive Metabolic Panel    Collection Time: 03/09/23  7:45 PM    Specimen: Blood   Result Value Ref Range    Glucose 173 (H) 65 - 99 mg/dL    BUN 9 6 - 20 mg/dL    Creatinine 0.63 0.57 - 1.00 mg/dL    Sodium 139 136 - 145 mmol/L    Potassium 3.8 3.5 - 5.2 mmol/L    Chloride 98 98 - 107 mmol/L    CO2 30.0 (H) 22.0 - 29.0 mmol/L    Calcium 9.6 8.6 - 10.5 mg/dL    Total Protein 7.7 6.0 - 8.5 g/dL    Albumin 4.3 3.5 - 5.2 g/dL    ALT (SGPT) 38 (H) 1 - 33 U/L    AST (SGOT) 26 1 - 32 U/L    Alkaline Phosphatase 127 (H) 39 - 117 U/L    Total Bilirubin 0.2 0.0 - 1.2 mg/dL    Globulin 3.4 gm/dL    A/G Ratio 1.3 g/dL    BUN/Creatinine Ratio 14.3 7.0 - 25.0    Anion Gap 11.0 5.0 - 15.0 mmol/L    eGFR 104.9 >60.0 mL/min/1.73   BNP    Collection Time: 03/09/23  7:45 PM    Specimen: Blood   Result Value Ref Range    proBNP 430.0 0.0 - 900.0 pg/mL   Single  High Sensitivity Troponin T    Collection Time: 03/09/23  7:45 PM    Specimen: Blood   Result Value Ref Range    HS Troponin T 6 <10 ng/L   Procalcitonin    Collection Time: 03/09/23  7:45 PM    Specimen: Blood   Result Value Ref Range    Procalcitonin 0.06 0.00 - 0.25 ng/mL   Lactic Acid, Plasma    Collection Time: 03/09/23  7:45 PM    Specimen: Blood   Result Value Ref Range    Lactate 2.3 (C) 0.5 - 2.0 mmol/L   CBC Auto Differential    Collection Time: 03/09/23  7:45 PM    Specimen: Blood   Result Value Ref Range    WBC 15.58 (H) 3.40 - 10.80 10*3/mm3    RBC 4.85 3.77 - 5.28 10*6/mm3    Hemoglobin 13.7 12.0 - 15.9 g/dL    Hematocrit 40.7 34.0 - 46.6 %    MCV 83.9 79.0 - 97.0 fL    MCH 28.2 26.6 - 33.0 pg    MCHC 33.7 31.5 - 35.7 g/dL    RDW 12.8 12.3 - 15.4 %    RDW-SD 39.4 37.0 - 54.0 fl    MPV 9.1 6.0 - 12.0 fL    Platelets 415 140 - 450 10*3/mm3    Neutrophil % 84.3 (H) 42.7 - 76.0 %    Lymphocyte % 9.1 (L) 19.6 - 45.3 %    Monocyte % 5.7 5.0 - 12.0 %    Eosinophil % 0.0 (L) 0.3 - 6.2 %    Basophil % 0.3 0.0 - 1.5 %    Immature Grans % 0.6 (H) 0.0 - 0.5 %    Neutrophils, Absolute 13.14 (H) 1.70 - 7.00 10*3/mm3    Lymphocytes, Absolute 1.41 0.70 - 3.10 10*3/mm3    Monocytes, Absolute 0.89 0.10 - 0.90 10*3/mm3    Eosinophils, Absolute 0.00 0.00 - 0.40 10*3/mm3    Basophils, Absolute 0.04 0.00 - 0.20 10*3/mm3    Immature Grans, Absolute 0.10 (H) 0.00 - 0.05 10*3/mm3    nRBC 0.0 0.0 - 0.2 /100 WBC   Respiratory Panel PCR w/COVID-19(SARS-CoV-2) [facility] In-House, NP Swab in UTM/VTM, 3-4 HR TAT - Swab, Nasopharynx    Collection Time: 03/09/23  8:18 PM    Specimen: Nasopharynx; Swab   Result Value Ref Range    ADENOVIRUS, PCR Not Detected Not Detected    Coronavirus 229E Not Detected Not Detected    Coronavirus HKU1 Not Detected Not Detected    Coronavirus NL63 Not Detected Not Detected    Coronavirus OC43 Not Detected Not Detected    COVID19 Not Detected Not Detected - Ref. Range    Human  Metapneumovirus Not Detected Not Detected    Human Rhinovirus/Enterovirus Not Detected Not Detected    Influenza A PCR Not Detected Not Detected    Influenza B PCR Not Detected Not Detected    Parainfluenza Virus 1 Not Detected Not Detected    Parainfluenza Virus 2 Not Detected Not Detected    Parainfluenza Virus 3 Not Detected Not Detected    Parainfluenza Virus 4 Not Detected Not Detected    RSV, PCR Not Detected Not Detected    Bordetella pertussis pcr Not Detected Not Detected    Bordetella parapertussis PCR Not Detected Not Detected    Chlamydophila pneumoniae PCR Not Detected Not Detected    Mycoplasma pneumo by PCR Not Detected Not Detected       Ordered the above labs and reviewed the results.        RADIOLOGY  XR Chest 2 View    Result Date: 3/9/2023  CHEST 2 VIEWS  HISTORY: Cough.  COMPARISON: None.  FINDINGS: PA and lateral views of the chest demonstrate the heart to be within normal limits in size. There is no evidence of infiltrate, effusion or of congestive failure. Minimal atelectasis/scarring involving the costophrenic angle on the right is noted.  This report was finalized on 3/9/2023 7:58 PM by Dr. Lake Ames M.D.        Ordered the above noted radiological studies. Reviewed by me in PACS.        PROCEDURES  Procedures      MEDICATIONS GIVEN IN ER  Medications   ipratropium-albuterol (DUO-NEB) nebulizer solution 3 mL (3 mL Nebulization Given 3/9/23 2034)             MEDICAL DECISION MAKING, PROGRESS, and CONSULTS    All labs have been independently reviewed by me.  All radiology studies have been reviewed by me and discussed with radiologist dictating the report.   EKG's independently viewed and interpreted by me.  Discussion below represents my analysis of pertinent findings related to patient's condition, differential diagnosis, treatment plan and final disposition.      Orders placed during this visit:  Orders Placed This Encounter   Procedures   • Respiratory Panel PCR  w/COVID-19(SARS-CoV-2) [facility] In-House, NP Swab in UTM/VTM, 3-4 HR TAT - Swab, Nasopharynx   • XR Chest 2 View   • Comprehensive Metabolic Panel   • BNP   • Single High Sensitivity Troponin T   • Procalcitonin   • Lactic Acid, Plasma   • CBC Auto Differential   • STAT Lactic Acid, Reflex   • Discontinue Patient Isolation   • CBC & Differential       Additional orders considered but not ordered:  N/A    Differential diagnosis:  Differential diagnosis for chest pain/dyspnea includes but is not limited to:  Muscle strain, costochondritis, myositis, pleurisy, rib fracture, intercostal neuritis, herpes zoster, tumor, myocardial infarction, coronary syndrome, unstable angina, angina, aortic dissection, mitral valve prolapse, pericarditis, palpitations, pulmonary embolus, pneumonia, pneumothorax, lung cancer, GERD, esophagitis, esophageal spasm      Independent interpretation of labs, radiology studies, and discussions with consultants:  ED Course as of 03/09/23 2145   Thu Mar 09, 2023   2002 Chest X-ray was independently visualized and preliminarily interpreted by me.  I see no infiltrates or effusion. CXR was discussed with/officially interpreted by Dr. Ames (radiology)-no acute processes are identified.  For official interpretation, see dictated report. [WC]   2002 WBC(!): 15.58  Patient has been on steroids.  CBC is otherwise unremarkable. [WC]   2016 Lactate(!!): 2.3 [WC]   2024 Procalcitonin: 0.06  Normal. [WC]   2024 HS Troponin T: 6  Normal. [WC]   2024 proBNP: 430.0  Normal. [WC]   2120 Viral panel is normal. [WC]   2143 Labs, EKG and imaging as above.  My clinical suspicion for a serious underlying pulmonary or cardiac etiology is low.  There is no evidence to suggest pulmonary embolism, aortic dissection or acute coronary syndrome.  No evidence of pneumonia/CHF/COPD exacerbation that would require inpatient admission.  The patient was advised to return to the emergency department should the  symptoms worsen or for any new concerns.  The patient can be safely followed as an outpatient for further workup as indicated.    I had an extensive discussion with the patient regarding smoking cessation.  She has not seen a pulmonologist in quite some time-referral to pulmonary given.  She has been on Bevespi in the past, but her prescription is 5 years  so this will be renewed.  I will also renew her albuterol inhaler as it is  as well. [WC]      ED Course User Index  [WC] Frank Fulton MD              Appropriate PPE was worn by myself and the patient throughout our entire interaction.    DIAGNOSIS  Final diagnoses:   COPD with acute exacerbation (HCC)   Tobacco abuse         DISPOSITION  Discharged            Latest Documented Vital Signs:  As of 21:45 EST  BP- 163/91 HR- 64 Temp- 98.6 °F (37 °C) (Tympanic) O2 sat- 100%        --    Please note that portions of this were completed with a voice recognition program.       Note Disclaimer: At [facility], we believe that sharing information builds trust and better relationships. You are receiving this note because you are receiving care at [facility] or recently visited. It is possible you will see health information before a provider has talked with you about it. This kind of information can be easy to misunderstand. To help you fully understand what it means for your health, we urge you to discuss this note with your provider.           Frank Fultno MD  [phone]

## 2023-03-21 ENCOUNTER — OFFICE VISIT (OUTPATIENT)
Dept: FAMILY MEDICINE CLINIC | Facility: CLINIC | Age: 58
End: 2023-03-21
Payer: COMMERCIAL

## 2023-03-21 VITALS
WEIGHT: 119 LBS | TEMPERATURE: 98.1 F | OXYGEN SATURATION: 98 % | SYSTOLIC BLOOD PRESSURE: 90 MMHG | BODY MASS INDEX: 21.09 KG/M2 | HEART RATE: 53 BPM | DIASTOLIC BLOOD PRESSURE: 58 MMHG | HEIGHT: 63 IN | RESPIRATION RATE: 18 BRPM

## 2023-03-21 DIAGNOSIS — Z87.891 FORMER SMOKER: ICD-10-CM

## 2023-03-21 DIAGNOSIS — E03.9 HYPOTHYROIDISM, UNSPECIFIED TYPE: Primary | ICD-10-CM

## 2023-03-21 DIAGNOSIS — E55.9 VITAMIN D DEFICIENCY: ICD-10-CM

## 2023-03-21 NOTE — PROGRESS NOTES
"Chief Complaint  Hypothyroidism    Subjective          History of Present Illness    Lyndsay Garcia 57 y.o. female presents today for lab work.      She has COPD and recently had COPD exacerbation with a URI a few days before.  She decided she was going to stop smoking when she had the last COPD exacerbation.  She reports chronic shortness of breath that has not worsened.  She stopped smoking 15 days ago.  She smoked 1 PPD for 40 years.       She takes Atenolol 25 mg daily for PVC's per Dr. Elia Walters, Cardiology.  She had a recent appointment with Cardiology on 2/20/2023.  Blood pressure and heart rate are a little low today.  The patient is asymptomatic.    She has vitamin D deficiency.  She takes 5,000 Units daily.  She has not taken vitamin D for one week.  Will check vitamin D level today.    She has hypothyroidism.  She takes [place] thyroid 60 mg daily.  Will check thyroid labs today.    Review of Systems   Constitutional: Negative for chills, fatigue and fever.   HENT: Negative for congestion and sinus pressure.    Eyes: Negative for visual disturbance.   Respiratory: Positive for shortness of breath (chronic, unchanged). Negative for cough and chest tightness.    Cardiovascular: Negative for chest pain and palpitations.   Gastrointestinal: Negative for abdominal pain, constipation, diarrhea, nausea and vomiting.   Genitourinary: Negative for dysuria, frequency and urgency.   Musculoskeletal: Negative for back pain.   Skin: Negative for rash.   Neurological: Negative for dizziness, syncope, speech difficulty and light-headedness.   Psychiatric/Behavioral: Negative for behavioral problems and sleep disturbance.        Objective   Vital Signs:   BP 90/58   Pulse 53   Temp 98.1 °F (36.7 °C)   Resp 18   Ht 160 cm (63\")   Wt 54 kg (119 lb)   SpO2 98%   BMI 21.08 kg/m²      BMI is within normal parameters. No other follow-up for BMI required.        Physical Exam  Vitals and nursing note reviewed. "   Constitutional:       Appearance: Normal appearance. She is well-developed. She is not toxic-appearing.   HENT:      Head: Normocephalic.      Right Ear: External ear normal.      Left Ear: External ear normal.   Eyes:      General: No scleral icterus.     Pupils: Pupils are equal, round, and reactive to light.   Neck:      Thyroid: No thyromegaly.      Vascular: No carotid bruit.   Cardiovascular:      Rate and Rhythm: Regular rhythm. Bradycardia present.      Pulses: Normal pulses.      Heart sounds: Normal heart sounds.   Pulmonary:      Effort: Pulmonary effort is normal. No respiratory distress.      Breath sounds: No stridor or decreased air movement. Wheezing (Chronic) present. No decreased breath sounds or rhonchi.   Musculoskeletal:         General: No deformity.      Right lower leg: No edema.      Left lower leg: No edema.   Skin:     General: Skin is warm.      Coloration: Skin is not jaundiced.   Neurological:      General: No focal deficit present.      Mental Status: She is alert and oriented to person, place, and time.      Motor: No weakness.      Gait: Gait normal.   Psychiatric:         Behavior: Behavior normal.         Thought Content: Thought content normal.         Judgment: Judgment normal.                         Assessment and Plan      Diagnoses and all orders for this visit:    1. Hypothyroidism, unspecified type (Primary)  Comments:  Continue [place] Thyroid daily  Thyroid labs today  Follow-up in 6 months  Orders:  -     TSH  -     T4, Free  -     T3, Free    2. Vitamin D deficiency  Comments:  Continue vitamin D daily  Labs today  Orders:  -     Vitamin D,25-Hydroxy    3. Former smoker  Comments:  Quit 2 weeks ago  Encouraged continued smoking cessation  Follow-up in 6 months  Orders:  -     Comprehensive metabolic panel  -     Lipid panel  -     CBC and Differential            Follow Up     Return in about 6 months (around 9/21/2023) for Next scheduled follow up.    Patient was given  instructions and counseling regarding her condition or for health maintenance advice. Please see specific information pulled into the AVS if appropriate.     -The patient was encouraged to monitor blood pressure and heart rate closely, and report abnormal readings to Cardiology ASAP.  She is asymptomatic today.  -Follow-up in 6 months.

## 2023-03-22 LAB
25(OH)D3+25(OH)D2 SERPL-MCNC: 71 NG/ML (ref 30–100)
ALBUMIN SERPL-MCNC: 4.4 G/DL (ref 3.5–5.2)
ALBUMIN/GLOB SERPL: 1.4 G/DL
ALP SERPL-CCNC: 111 U/L (ref 39–117)
ALT SERPL-CCNC: 18 U/L (ref 1–33)
AST SERPL-CCNC: 21 U/L (ref 1–32)
BASOPHILS # BLD AUTO: 0.07 10*3/MM3 (ref 0–0.2)
BASOPHILS NFR BLD AUTO: 0.9 % (ref 0–1.5)
BILIRUB SERPL-MCNC: 0.3 MG/DL (ref 0–1.2)
BUN SERPL-MCNC: 9 MG/DL (ref 6–20)
BUN/CREAT SERPL: 14.3 (ref 7–25)
CALCIUM SERPL-MCNC: 9.9 MG/DL (ref 8.6–10.5)
CHLORIDE SERPL-SCNC: 96 MMOL/L (ref 98–107)
CHOLEST SERPL-MCNC: 161 MG/DL (ref 0–200)
CO2 SERPL-SCNC: 30.1 MMOL/L (ref 22–29)
CREAT SERPL-MCNC: 0.63 MG/DL (ref 0.57–1)
EGFRCR SERPLBLD CKD-EPI 2021: 104.9 ML/MIN/1.73
EOSINOPHIL # BLD AUTO: 0.1 10*3/MM3 (ref 0–0.4)
EOSINOPHIL NFR BLD AUTO: 1.3 % (ref 0.3–6.2)
ERYTHROCYTE [DISTWIDTH] IN BLOOD BY AUTOMATED COUNT: 13.1 % (ref 12.3–15.4)
GLOBULIN SER CALC-MCNC: 3.1 GM/DL
GLUCOSE SERPL-MCNC: 95 MG/DL (ref 65–99)
HCT VFR BLD AUTO: 44.7 % (ref 34–46.6)
HDLC SERPL-MCNC: 40 MG/DL (ref 40–60)
HGB BLD-MCNC: 14.2 G/DL (ref 12–15.9)
IMM GRANULOCYTES # BLD AUTO: 0.02 10*3/MM3 (ref 0–0.05)
IMM GRANULOCYTES NFR BLD AUTO: 0.3 % (ref 0–0.5)
LDLC SERPL CALC-MCNC: 93 MG/DL (ref 0–100)
LYMPHOCYTES # BLD AUTO: 1.94 10*3/MM3 (ref 0.7–3.1)
LYMPHOCYTES NFR BLD AUTO: 26.1 % (ref 19.6–45.3)
MCH RBC QN AUTO: 27.6 PG (ref 26.6–33)
MCHC RBC AUTO-ENTMCNC: 31.8 G/DL (ref 31.5–35.7)
MCV RBC AUTO: 86.8 FL (ref 79–97)
MONOCYTES # BLD AUTO: 0.67 10*3/MM3 (ref 0.1–0.9)
MONOCYTES NFR BLD AUTO: 9 % (ref 5–12)
NEUTROPHILS # BLD AUTO: 4.62 10*3/MM3 (ref 1.7–7)
NEUTROPHILS NFR BLD AUTO: 62.4 % (ref 42.7–76)
NRBC BLD AUTO-RTO: 0 /100 WBC (ref 0–0.2)
PLATELET # BLD AUTO: 414 10*3/MM3 (ref 140–450)
POTASSIUM SERPL-SCNC: 4.7 MMOL/L (ref 3.5–5.2)
PROT SERPL-MCNC: 7.5 G/DL (ref 6–8.5)
RBC # BLD AUTO: 5.15 10*6/MM3 (ref 3.77–5.28)
SODIUM SERPL-SCNC: 136 MMOL/L (ref 136–145)
T3FREE SERPL-MCNC: 4.1 PG/ML (ref 2–4.4)
T4 FREE SERPL-MCNC: 0.84 NG/DL (ref 0.93–1.7)
TRIGL SERPL-MCNC: 161 MG/DL (ref 0–150)
TSH SERPL DL<=0.005 MIU/L-ACNC: 5.87 UIU/ML (ref 0.27–4.2)
VLDLC SERPL CALC-MCNC: 28 MG/DL (ref 5–40)
WBC # BLD AUTO: 7.42 10*3/MM3 (ref 3.4–10.8)

## 2023-03-23 ENCOUNTER — TELEPHONE (OUTPATIENT)
Dept: CARDIOLOGY | Facility: CLINIC | Age: 58
End: 2023-03-23
Payer: COMMERCIAL

## 2023-03-23 NOTE — TELEPHONE ENCOUNTER
Called and talked with patient.  Sounds like after her recent viral infection her blood pressure and heart rate of been running low.  Previously tolerated atenolol fairly well but as of late blood pressure and heart rate are running low with rates in the 60s and blood pressures in the 90s systolics.  With this being the case I would actually have her hold her atenolol there only for PVCs if PVCs come back we could consider alternative agent she will stop this medication we will monitor how she is doing otherwise we will see her back as scheduled

## 2023-03-23 NOTE — TELEPHONE ENCOUNTER
Patient called stating that she is having trouble with her B/P and HR. This started when she was put on Atenolol 25 mg qd. She seen her PCP on tuesday,and her B/P was 90/58, HR 53. She's been feeling very fatigue and seem to be having headches.    She stopped the medication for one day, and her B/P and HR was normal. She took the Atenolol the next day, and it dropped her B/P & HR.     She can be reached at [phone]

## 2023-03-29 ENCOUNTER — TELEPHONE (OUTPATIENT)
Dept: FAMILY MEDICINE CLINIC | Facility: CLINIC | Age: 58
End: 2023-03-29

## 2023-03-29 DIAGNOSIS — R05.8 PRODUCTIVE COUGH: ICD-10-CM

## 2023-03-29 DIAGNOSIS — J06.9 UPPER RESPIRATORY TRACT INFECTION, UNSPECIFIED TYPE: Primary | ICD-10-CM

## 2023-03-29 RX ORDER — AMOXICILLIN AND CLAVULANATE POTASSIUM 875; 125 MG/1; MG/1
1 TABLET, FILM COATED ORAL 2 TIMES DAILY
Qty: 20 TABLET | Refills: 0 | Status: SHIPPED | OUTPATIENT
Start: 2023-03-29 | End: 2023-04-08

## 2023-03-29 NOTE — TELEPHONE ENCOUNTER
Caller: Lyndsay Garcia    Relationship: Self    Best call back number: [phone]    What test was performed: BLOOD WORK    When was the test performed: 03/21/23    Where was the test performed: IN OFFICE    Additional notes: PLEASE ADVISE.

## 2023-03-29 NOTE — TELEPHONE ENCOUNTER
Caller: Lyndsay Garcia    Relationship: Self    Best call back number: [phone]    What medication are you requesting: STRONGER ANTIBIOTIC    What are your current symptoms: COUGH AND CONGESTION    How long have you been experiencing symptoms: OVER A WEEK    Have you had these symptoms before:    [x] Yes  [] No    Have you been treated for these symptoms before:   [x] Yes  [] No    If a prescription is needed, what is your preferred pharmacy and phone number: [facility] [phone] - [place] [id number] AT [facility] [phone] [facility] [phone]     Additional notes: PLEASE ADVISE.

## 2023-03-30 DIAGNOSIS — E03.9 HYPOTHYROIDISM, UNSPECIFIED TYPE: Primary | ICD-10-CM

## 2023-03-30 RX ORDER — LEVOTHYROXINE AND LIOTHYRONINE 57; 13.5 UG/1; UG/1
90 TABLET ORAL DAILY
Qty: 90 TABLET | Refills: 2 | Status: SHIPPED | OUTPATIENT
Start: 2023-03-30 | End: 2023-04-04 | Stop reason: SDUPTHER

## 2023-04-04 DIAGNOSIS — E03.9 HYPOTHYROIDISM, UNSPECIFIED TYPE: ICD-10-CM

## 2023-04-04 RX ORDER — LEVOTHYROXINE AND LIOTHYRONINE 57; 13.5 UG/1; UG/1
90 TABLET ORAL DAILY
Qty: 30 TABLET | Refills: 2 | Status: SHIPPED | OUTPATIENT
Start: 2023-04-04

## 2023-09-26 ENCOUNTER — OFFICE VISIT (OUTPATIENT)
Dept: FAMILY MEDICINE CLINIC | Facility: CLINIC | Age: 58
End: 2023-09-26
Payer: COMMERCIAL

## 2023-09-26 VITALS
SYSTOLIC BLOOD PRESSURE: 115 MMHG | DIASTOLIC BLOOD PRESSURE: 66 MMHG | HEART RATE: 68 BPM | BODY MASS INDEX: 20.55 KG/M2 | HEIGHT: 63 IN | OXYGEN SATURATION: 94 % | RESPIRATION RATE: 16 BRPM | TEMPERATURE: 98.9 F | WEIGHT: 116 LBS

## 2023-09-26 DIAGNOSIS — E03.9 HYPOTHYROIDISM, UNSPECIFIED TYPE: ICD-10-CM

## 2023-09-26 DIAGNOSIS — R09.81 NASAL CONGESTION: ICD-10-CM

## 2023-09-26 DIAGNOSIS — R35.0 FREQUENCY OF URINATION: Primary | ICD-10-CM

## 2023-09-26 LAB
BILIRUB BLD-MCNC: NEGATIVE MG/DL
CLARITY, POC: CLEAR
COLOR UR: YELLOW
EXPIRATION DATE: NORMAL
GLUCOSE UR STRIP-MCNC: NEGATIVE MG/DL
KETONES UR QL: NEGATIVE
LEUKOCYTE EST, POC: NEGATIVE
Lab: NORMAL
NITRITE UR-MCNC: NEGATIVE MG/ML
PH UR: 6 [PH] (ref 5–8)
PROT UR STRIP-MCNC: NEGATIVE MG/DL
RBC # UR STRIP: NEGATIVE /UL
SP GR UR: 1 (ref 1–1.03)
UROBILINOGEN UR QL: NORMAL

## 2023-09-26 PROCEDURE — 81003 URINALYSIS AUTO W/O SCOPE: CPT

## 2023-09-26 PROCEDURE — 99214 OFFICE O/P EST MOD 30 MIN: CPT

## 2023-09-26 RX ORDER — THYROID 90 MG/1
90 TABLET ORAL DAILY
Qty: 30 TABLET | Refills: 2 | Status: CANCELLED | OUTPATIENT
Start: 2023-09-26

## 2023-09-26 RX ORDER — THYROID 90 MG/1
90 TABLET ORAL DAILY
Qty: 90 TABLET | Refills: 1 | Status: SHIPPED | OUTPATIENT
Start: 2023-09-26

## 2023-09-26 NOTE — PROGRESS NOTES
Chief Complaint  Follow-up, Urinary Tract Infection, and Hypothyroidism    Subjective         Urinary Tract Infection   Associated symptoms include frequency and urgency. Pertinent negatives include no chills, flank pain, hematuria, nausea or vomiting.     Lyndsay Garcia 58 y.o.female complains of urinary symptoms.  She complains of urgency and frequency. She has had symptoms for 1 week. The symptoms are mild.  Patient denies fever, flank pain, gross blood in urine, dysuria, nausea, vomiting, abdominal pain, possible pregnancy, and risk of STD.  Patient has tried  increasing fluids for relief of symptoms.  Patient does have a history of recurrent UTI. Patient does not have a history of pyelonephritis.    The patient has hypothyroidism.  Labs were checked in March of this year.  The patient was given the following results:  Your blood sugar and liver function are back to normal.  Great job!!   Triglycerides are slightly elevated.  I recommend a low-cholesterol/low-sugar diet.  Your TSH thyroid lab is elevated and free T4 is low.  I would like to increase your Swanton thyroid medication to 90 mg and recheck your labs in 2 months.  I will send a new prescription to your pharmacy.  Please make sure you take the medication every day and do not miss any doses.  Your blood count is back to normal.  Vitamin D level is normal.  Please call the office to make a lab appointment or go to any [facility] to have your labs rechecked in 2 months.    The patient did not have thyroid labs redrawn in May of this year because her mother passed away.  She has been taking Swanton Thyroid daily.  She would like to change to NP thyroid 90 mg due to cost.  She is taking NP thyroid in the past and tolerated it well.  Medication reviewed.  Will refill medication and order updated thyroid labs today.    She also reports nasal congestion.  No fever, chills, sinus pain or pressure, sore throat, or ear pain.  She is taking Zyrtec daily.  She does not  "use nasal steroid spray or nasal saline rinse or spray.      Review of Systems   Constitutional:  Negative for chills, fatigue and fever.   HENT:  Positive for congestion (nasal). Negative for ear pain, rhinorrhea, sinus pressure, sore throat and trouble swallowing.    Eyes:  Negative for visual disturbance.   Respiratory:  Negative for cough, chest tightness and shortness of breath.    Cardiovascular:  Negative for chest pain and palpitations.   Gastrointestinal:  Negative for abdominal pain, constipation, diarrhea, nausea and vomiting.   Genitourinary:  Positive for frequency and urgency. Negative for difficulty urinating, dysuria, flank pain, hematuria and pelvic pain.   Musculoskeletal:  Negative for back pain.   Skin:  Negative for rash.   Neurological:  Negative for dizziness, syncope and light-headedness.   Psychiatric/Behavioral:  Negative for behavioral problems and sleep disturbance.       Objective   Vital Signs:   /66 (BP Location: Left arm, Patient Position: Sitting, Cuff Size: Adult)   Pulse 68   Temp 98.9 °F (37.2 °C) (Oral)   Resp 16   Ht 160 cm (62.99\")   Wt 52.6 kg (116 lb)   SpO2 94%   BMI 20.55 kg/m²      BMI is within normal parameters. No other follow-up for BMI required.        Physical Exam  Vitals and nursing note reviewed.   Constitutional:       General: She is not in acute distress.     Appearance: She is well-developed. She is not diaphoretic.   HENT:      Head: Normocephalic and atraumatic.      Nose: Congestion present. No mucosal edema or rhinorrhea.      Right Sinus: No maxillary sinus tenderness or frontal sinus tenderness.      Left Sinus: No maxillary sinus tenderness or frontal sinus tenderness.      Mouth/Throat:      Mouth: Mucous membranes are moist.      Pharynx: No pharyngeal swelling, oropharyngeal exudate, posterior oropharyngeal erythema or uvula swelling.      Tonsils: No tonsillar exudate or tonsillar abscesses.   Eyes:      Conjunctiva/sclera: Conjunctivae " normal.   Neck:      Vascular: No carotid bruit.   Pulmonary:      Effort: Pulmonary effort is normal. No respiratory distress.      Breath sounds: Normal breath sounds. No decreased air movement. No decreased breath sounds, wheezing, rhonchi or rales.   Abdominal:      General: Bowel sounds are normal. There is no distension.      Palpations: Abdomen is soft. There is no mass.      Tenderness: There is no abdominal tenderness. There is no right CVA tenderness, left CVA tenderness, guarding or rebound.   Musculoskeletal:         General: Normal range of motion.      Cervical back: Neck supple.   Skin:     General: Skin is warm and dry.      Findings: No rash.   Neurological:      Mental Status: She is alert and oriented to person, place, and time.      Deep Tendon Reflexes: Reflexes are normal and symmetric.   Psychiatric:         Behavior: Behavior normal.         Thought Content: Thought content normal.         Judgment: Judgment normal.        The following data was reviewed by: MELANIE Calderon on 09/26/2023:  POCT urinalysis dipstick, automated (09/26/2023 08:15)   TSH (03/21/2023 13:47)  T4, Free (03/21/2023 13:47)  T3, Free (03/21/2023 13:47)             Assessment and Plan      Diagnoses and all orders for this visit:    1. Frequency of urination (Primary)  -     POCT urinalysis dipstick, automated  -     Urine Culture - Urine, Urine, Clean Catch    2. Hypothyroidism, unspecified type  Comments:  NP thyroid daily   Recheck thyroid labs today  Follow-up in 6 months  Orders:  -     Thyroid (NP THYROID) 90 MG PO tablet; Take 1 tablet by mouth Daily.  Dispense: 90 tablet; Refill: 1  -     TSH+Free T4  -     T3, free    3. Nasal congestion  Comments:  Zyrtec daily  Nasal saline rinse and saline spray  Nasal steroid spray as needed  Return if no improvement            Follow Up     Return if symptoms worsen or fail to improve.    Patient was given instructions and counseling regarding her condition or for  health maintenance advice. Please see specific information pulled into the AVS if appropriate.     -Urinalysis is negative for UTI and negative for blood today.  Will send urine specimen for urine culture.  Will change antibiotic if deemed necessary from urine culture results.  -Increase daily fluid intake to 2 to 3 L/day, restrict high oxalate foods such as beans/spinach/beets/potato chips/french fries/nuts/tea.    -Maintain proper urinary hygiene.  -Seek immediate medical attention for severe pain in back or lower abdomen, fever, nausea and vomiting, chills, or any other worsening signs or symptoms.  -Return to the office if symptoms worsen or fail to improve.

## 2023-09-26 NOTE — LETTER
September 26, 2023     Patient: Lyndsay Garcia   YOB: 1965   Date of Visit: 9/26/2023       To Whom It May Concern:    It is my medical opinion that Lyndsay Garcia may return to work today.         Sincerely,        MELANIE Calderon    CC: No Recipients

## 2023-09-27 DIAGNOSIS — M62.838 MUSCLE SPASMS OF NECK: ICD-10-CM

## 2023-09-27 DIAGNOSIS — E03.9 HYPOTHYROIDISM, UNSPECIFIED TYPE: Primary | ICD-10-CM

## 2023-09-27 LAB
T3FREE SERPL-MCNC: 2.3 PG/ML (ref 2–4.4)
T4 FREE SERPL-MCNC: 0.7 NG/DL (ref 0.93–1.7)
TSH SERPL DL<=0.005 MIU/L-ACNC: 0.68 UIU/ML (ref 0.27–4.2)

## 2023-09-27 RX ORDER — TIZANIDINE 4 MG/1
4 TABLET ORAL NIGHTLY PRN
Qty: 30 TABLET | Refills: 3 | Status: SHIPPED | OUTPATIENT
Start: 2023-09-27 | End: 2023-09-27 | Stop reason: SDUPTHER

## 2023-09-27 RX ORDER — TIZANIDINE 4 MG/1
4 TABLET ORAL NIGHTLY PRN
Qty: 30 TABLET | Refills: 3 | Status: SHIPPED | OUTPATIENT
Start: 2023-09-27

## 2023-09-27 NOTE — TELEPHONE ENCOUNTER
Caller: Lyndsay Garcia    Relationship: Self    Best call back number: [phone]     Requested Prescriptions:   Requested Prescriptions     Pending Prescriptions Disp Refills    tiZANidine (ZANAFLEX) 4 MG tablet 30 tablet 3     Sig: Take 1 tablet by mouth At Night As Needed for Muscle Spasms.        Pharmacy where request should be sent: [facility] [id number] [id number] [facility] [address] - [phone]  - [phone] FX     Last office visit with prescribing clinician: 9/26/2023   Last telemedicine visit with prescribing clinician: Visit date not found   Next office visit with prescribing clinician: 3/18/2024     Additional details provided by patient: PATIENT REQUESTING A 90 DAY SUPPLY WITH REFILLS TO LAST UNTIL MARCH    Does the patient have less than a 3 day supply:  [x] Yes  [] No    Would you like a call back once the refill request has been completed: [x] Yes [] No    If the office needs to give you a call back, can they leave a voicemail: [x] Yes [] No    Tahira Rosado Rep   09/27/23 08:03 EDT

## 2023-09-28 LAB
BACTERIA UR CULT: NO GROWTH
BACTERIA UR CULT: NORMAL

## 2023-11-14 DIAGNOSIS — E03.9 HYPOTHYROIDISM, UNSPECIFIED TYPE: Primary | ICD-10-CM

## 2023-11-14 RX ORDER — THYROID 60 MG/1
60 TABLET ORAL DAILY
Qty: 60 TABLET | Refills: 0 | Status: SHIPPED | OUTPATIENT
Start: 2023-11-14

## 2023-11-22 ENCOUNTER — TELEPHONE (OUTPATIENT)
Dept: FAMILY MEDICINE CLINIC | Facility: CLINIC | Age: 58
End: 2023-11-22

## 2023-11-22 NOTE — TELEPHONE ENCOUNTER
Called spoke to pt she stated you need to stop changing her medicine because she can't afford it.

## 2023-11-22 NOTE — TELEPHONE ENCOUNTER
Caller: Lyndsay Garcia    Relationship: Self    Best call back number: [phone]    What is the best time to reach you: ANYTIME    Who are you requesting to speak with (clinical staff, provider,  specific staff member): CLINICAL    What was the call regarding: PATIENT IS REQUESTING A CALLBACK TO GO OVER LAB RESULTS FROM 11/10/23

## 2023-12-26 ENCOUNTER — TELEPHONE (OUTPATIENT)
Dept: FAMILY MEDICINE CLINIC | Facility: CLINIC | Age: 58
End: 2023-12-26
Payer: COMMERCIAL

## 2024-01-02 DIAGNOSIS — E03.9 HYPOTHYROIDISM, UNSPECIFIED TYPE: Primary | ICD-10-CM

## 2024-01-08 ENCOUNTER — TELEPHONE (OUTPATIENT)
Dept: FAMILY MEDICINE CLINIC | Facility: CLINIC | Age: 59
End: 2024-01-08
Payer: COMMERCIAL

## 2024-01-17 DIAGNOSIS — E03.9 HYPOTHYROIDISM, UNSPECIFIED TYPE: ICD-10-CM

## 2024-01-17 DIAGNOSIS — M62.838 MUSCLE SPASMS OF NECK: ICD-10-CM

## 2024-01-18 NOTE — TELEPHONE ENCOUNTER
Rx Refill Note  Requested Prescriptions     Pending Prescriptions Disp Refills    tiZANidine (ZANAFLEX) 4 MG tablet 30 tablet 3     Sig: Take 1 tablet by mouth At Night As Needed for Muscle Spasms.    Thyroid (NP THYROID) 60 MG PO tablet 60 tablet 0     Sig: Take 1 tablet by mouth Daily.      Last office visit with prescribing clinician: 9/26/2023   Last telemedicine visit with prescribing clinician: Visit date not found   Next office visit with prescribing clinician: 3/18/2024                         Would you like a call back once the refill request has been completed: [] Yes [] No    If the office needs to give you a call back, can they leave a voicemail: [] Yes [] No    Fabiola Ramos MA  01/18/24, 08:19 EST

## 2024-01-20 RX ORDER — TIZANIDINE 4 MG/1
4 TABLET ORAL NIGHTLY PRN
Qty: 30 TABLET | Refills: 0 | Status: SHIPPED | OUTPATIENT
Start: 2024-01-20

## 2024-01-20 RX ORDER — THYROID 60 MG/1
60 TABLET ORAL DAILY
Qty: 30 TABLET | Refills: 0 | Status: SHIPPED | OUTPATIENT
Start: 2024-01-20

## 2024-02-21 ENCOUNTER — OFFICE VISIT (OUTPATIENT)
Dept: CARDIOLOGY | Facility: CLINIC | Age: 59
End: 2024-02-21
Payer: COMMERCIAL

## 2024-02-21 VITALS
HEART RATE: 73 BPM | DIASTOLIC BLOOD PRESSURE: 64 MMHG | HEIGHT: 63 IN | BODY MASS INDEX: 20.87 KG/M2 | WEIGHT: 117.8 LBS | SYSTOLIC BLOOD PRESSURE: 102 MMHG

## 2024-02-21 DIAGNOSIS — Z72.0 TOBACCO USE: ICD-10-CM

## 2024-02-21 DIAGNOSIS — R06.09 DYSPNEA ON EXERTION: ICD-10-CM

## 2024-02-21 DIAGNOSIS — J43.8 OTHER EMPHYSEMA: ICD-10-CM

## 2024-02-21 DIAGNOSIS — E03.9 HYPOTHYROIDISM, UNSPECIFIED TYPE: ICD-10-CM

## 2024-02-21 DIAGNOSIS — E03.8 OTHER SPECIFIED HYPOTHYROIDISM: ICD-10-CM

## 2024-02-21 DIAGNOSIS — I49.3 PVC (PREMATURE VENTRICULAR CONTRACTION): Primary | ICD-10-CM

## 2024-02-21 PROCEDURE — 99214 OFFICE O/P EST MOD 30 MIN: CPT | Performed by: NURSE PRACTITIONER

## 2024-02-21 PROCEDURE — 93000 ELECTROCARDIOGRAM COMPLETE: CPT | Performed by: NURSE PRACTITIONER

## 2024-02-21 RX ORDER — CETIRIZINE HYDROCHLORIDE 10 MG/1
10 TABLET ORAL DAILY
COMMUNITY

## 2024-02-21 NOTE — PROGRESS NOTES
Date of Office Visit: 2024  Encounter Provider: MELANIE Yadav  Place of Service: [facility]  Patient Name: Lyndsay Garcia  :1965    No chief complaint on file.  : PVC    HPI: Lyndsay Garcia is a 58 y.o. female who is a patient of  Dr. Walters.  She is new to me today and presents for 1 year office follow-up.  Has a history of PVCs, asthma, COPD, hypothyroidism and tobacco abuse.  Patient was never symptomatic with PVCs.  She was started on atenolol for short while however blood pressure was too soft to tolerate.  Echocardiogram in  shows normal left ventricular function and no pulmonary hypertension.  Last office visit, it was suggested that she consider seeing a pulmonologist or pursue stress testing; however she deferred.    Ms. Garcia has had chronic dyspnea on exertion since having COVID a couple years ago and is taking an inhaler prescribed to her by her PCP.  Continues to smoke about a half a pack to a pack of cigarettes a day.  She has been smoking for about 40 years.  She is euvolemic on physical exam.  She has had a nonproductive cough for the past couple of months secondary to her drainage.  Patient notes that she is on several allergy medications.  She is agreeable at this time to be referred to pulmonary group.      Previous testing and notes have been reviewed by me.   Past Medical History:   Diagnosis Date    Abdominal pain     Abnormal blood chemistry     Abnormal Pap smear of cervix     Acute maxillary sinusitis     Allergic rhinitis     Chronic constipation     Chronic obstructive pulmonary disease     Constipation     Fatigue     Hematuria     Hypothyroidism     IBS (irritable bowel syndrome)     Increased abdominal girth     Migraine with aura, intractable     Neck pain     Perimenopause     Vitamin D deficiency        Past Surgical History:   Procedure Laterality Date    CATARACT EXTRACTION, BILATERAL  2018       Social History      Socioeconomic History    Marital status: Unknown   Tobacco Use    Smoking status: Every Day     Packs/day: 0.50     Years: 40.00     Additional pack years: 0.00     Total pack years: 20.00     Types: Cigarettes    Smokeless tobacco: Never   Substance and Sexual Activity    Alcohol use: No     Comment: caffeine use    Drug use: No    Sexual activity: Yes     Partners: Male     Birth control/protection: None       Family History   Problem Relation Age of Onset    Diabetes Mother     Heart disease Mother     Hypertension Mother     Cancer Father     Diabetes Sister     Diabetes Sister     Kidney disease Sister        Review of Systems   Constitutional: Negative.   HENT: Negative.     Eyes: Negative.    Cardiovascular: Negative.    Respiratory:  Positive for cough and shortness of breath.    Endocrine: Negative.    Hematologic/Lymphatic: Negative.    Skin: Negative.    Musculoskeletal: Negative.    Gastrointestinal: Negative.    Genitourinary: Negative.    Neurological: Negative.    Psychiatric/Behavioral: Negative.     Allergic/Immunologic: Negative.        Allergies   Allergen Reactions    Sulfa Antibiotics Itching         Current Outpatient Medications:     albuterol sulfate  (90 Base) MCG/ACT inhaler, Inhale 2 puffs Every 6 (Six) Hours As Needed for Wheezing or Shortness of Air., Disp: 18 g, Rfl: 3    Glycopyrrolate-Formoterol (Bevespi Aerosphere) 9-4.8 MCG/ACT aerosol, Inhale 2 sprays 2 (Two) Times a Day., Disp: 10.7 g, Rfl: 3    levocetirizine (XYZAL) 5 MG tablet, Take 1 tablet by mouth Every Evening., Disp: 90 tablet, Rfl: 3    Thyroid (NP THYROID) 60 MG PO tablet, Take 1 tablet by mouth Daily., Disp: 30 tablet, Rfl: 0    tiZANidine (ZANAFLEX) 4 MG tablet, Take 1 tablet by mouth At Night As Needed for Muscle Spasms., Disp: 30 tablet, Rfl: 0    vitamin B-12 (CYANOCOBALAMIN) 1000 MCG tablet, Take 1 tablet by mouth Daily., Disp: , Rfl:     VITAMIN D PO, Take 5,000 Units by mouth Daily., Disp: , Rfl:        Objective:     There were no vitals filed for this visit.  There is no height or weight on file to calculate BMI.    Holter monitor 9/14/2022:  A relatively benign monitor study.  Underlying heart rhythm was sinus rhythm with an average heart rate of 69 bpm and a heart rate range of 45 bpm up to 111 bpm  4.3% burden of premature ventricular contractions  Patient triggered events correlated with underlying sinus rhythm.     Echocardiogram 9/6/2022:  Left ventricular ejection fraction appears to be 61 - 65%.  Left ventricular diastolic function was normal.  Normal right ventricular cavity size and systolic function noted.  Normal left atrial cavity size noted  Interatrial septal aneurysm present.  Calculated right ventricular systolic pressure from tricuspid regurgitation is 19 mmHg.  There is a trivial pericardial effusion.    PHYSICAL EXAM:    Constitutional:       Appearance: Healthy appearance. Not in distress.   Neck:      Vascular: No JVR. JVD normal.   Pulmonary:      Effort: Pulmonary effort is normal.      Breath sounds: Normal breath sounds. No wheezing. No rhonchi. No rales.   Chest:      Chest wall: Not tender to palpatation.   Cardiovascular:      PMI at left midclavicular line. Normal rate. Regular rhythm. Normal S1. Normal S2.       Murmurs: There is no murmur.      No gallop.  No click. No rub.   Pulses:     Intact distal pulses.   Edema:     Peripheral edema absent.   Abdominal:      General: Bowel sounds are normal.      Palpations: Abdomen is soft.      Tenderness: There is no abdominal tenderness.   Musculoskeletal: Normal range of motion.         General: No tenderness. Skin:     General: Skin is warm and dry.   Neurological:      General: No focal deficit present.      Mental Status: Alert and oriented to person, place and time.           ECG 12 Lead    Date/Time: 2/21/2024 10:47 AM  Performed by: Manuela Dunbar APRN    Authorized by: Manuela Dunbar APRN  Comparison: compared with  previous ECG from 2/20/2023  Similar to previous ECG  Rhythm: sinus rhythm  BPM: 73  Conduction: conduction normal  ST Segments: ST segments normal  T Waves: T waves normal    Clinical impression: normal ECG            Assessment:       Diagnosis Plan   1. PVC (premature ventricular contraction)        2. Other specified hypothyroidism        3. Tobacco use        4. Dyspnea on exertion          No orders of the defined types were placed in this encounter.         Plan:       PVCs: Asymptomatic. No PVCs on EKG today.  Patient drinks caffeinated soft drinks most of the time.  Recommend to decrease soft drink intake.  Tobacco abuse: Smoking cessation recommended  Dyspnea on exertion: chronic.  Referral placed to [facility] group    Ms. Garcia will follow-up with Dr. Noble in 1 year.  She will call sooner with any questions or concerns         Your medication list            Accurate as of February 21, 2024  7:40 AM. If you have any questions, ask your nurse or doctor.                CONTINUE taking these medications        Instructions Last Dose Given Next Dose Due   albuterol sulfate  (90 Base) MCG/ACT inhaler  Commonly known as: PROVENTIL HFA;VENTOLIN HFA;PROAIR HFA      Inhale 2 puffs Every 6 (Six) Hours As Needed for Wheezing or Shortness of Air.       Bevespi Aerosphere 9-4.8 MCG/ACT aerosol  Generic drug: Glycopyrrolate-Formoterol      Inhale 2 sprays 2 (Two) Times a Day.       levocetirizine 5 MG tablet  Commonly known as: XYZAL      Take 1 tablet by mouth Every Evening.       Thyroid 60 MG tablet  Commonly known as: NP Thyroid      Take 1 tablet by mouth Daily.       tiZANidine 4 MG tablet  Commonly known as: ZANAFLEX      Take 1 tablet by mouth At Night As Needed for Muscle Spasms.       vitamin B-12 1000 MCG tablet  Commonly known as: CYANOCOBALAMIN      Take 1 tablet by mouth Daily.       VITAMIN D PO      Take 5,000 Units by mouth Daily.                  As always, it has been a  pleasure to participate in your patient's care.      Sincerely,       MELANIE Haines

## 2024-02-24 RX ORDER — LEVOTHYROXINE, LIOTHYRONINE 38; 9 UG/1; UG/1
60 TABLET ORAL DAILY
Qty: 30 TABLET | Refills: 0 | Status: SHIPPED | OUTPATIENT
Start: 2024-02-24

## 2024-03-18 ENCOUNTER — OFFICE VISIT (OUTPATIENT)
Dept: FAMILY MEDICINE CLINIC | Facility: CLINIC | Age: 59
End: 2024-03-18
Payer: COMMERCIAL

## 2024-03-18 ENCOUNTER — TELEPHONE (OUTPATIENT)
Dept: FAMILY MEDICINE CLINIC | Facility: CLINIC | Age: 59
End: 2024-03-18

## 2024-03-18 VITALS
WEIGHT: 116 LBS | TEMPERATURE: 97.2 F | RESPIRATION RATE: 16 BRPM | HEIGHT: 63 IN | DIASTOLIC BLOOD PRESSURE: 66 MMHG | HEART RATE: 58 BPM | BODY MASS INDEX: 20.55 KG/M2 | SYSTOLIC BLOOD PRESSURE: 102 MMHG | OXYGEN SATURATION: 95 %

## 2024-03-18 DIAGNOSIS — T78.40XS ALLERGY, SEQUELA: ICD-10-CM

## 2024-03-18 DIAGNOSIS — M62.838 MUSCLE SPASMS OF NECK: ICD-10-CM

## 2024-03-18 DIAGNOSIS — E55.9 VITAMIN D DEFICIENCY: ICD-10-CM

## 2024-03-18 DIAGNOSIS — J44.1 COPD WITH EXACERBATION: ICD-10-CM

## 2024-03-18 DIAGNOSIS — E03.8 OTHER SPECIFIED HYPOTHYROIDISM: Primary | ICD-10-CM

## 2024-03-18 RX ORDER — THYROID 60 MG/1
60 TABLET ORAL DAILY
Qty: 90 TABLET | Refills: 1 | Status: SHIPPED | OUTPATIENT
Start: 2024-03-18

## 2024-03-18 RX ORDER — GLYCOPYRROLATE AND FORMOTEROL FUMARATE 9; 4.8 UG/1; UG/1
2 AEROSOL, METERED RESPIRATORY (INHALATION) 2 TIMES DAILY
Qty: 10.7 G | Refills: 5 | Status: SHIPPED | OUTPATIENT
Start: 2024-03-18

## 2024-03-18 RX ORDER — TIZANIDINE 4 MG/1
4 TABLET ORAL NIGHTLY PRN
Qty: 90 TABLET | Refills: 0 | Status: SHIPPED | OUTPATIENT
Start: 2024-03-18

## 2024-03-18 RX ORDER — FEXOFENADINE HCL 180 MG/1
180 TABLET ORAL DAILY
Qty: 90 TABLET | Refills: 3 | Status: SHIPPED | OUTPATIENT
Start: 2024-03-18

## 2024-03-18 RX ORDER — ALBUTEROL SULFATE 90 UG/1
2 AEROSOL, METERED RESPIRATORY (INHALATION) EVERY 4 HOURS PRN
Qty: 18 G | Refills: 5 | Status: SHIPPED | OUTPATIENT
Start: 2024-03-18

## 2024-03-18 NOTE — PROGRESS NOTES
"Chief Complaint  Hypothyroidism and Vitamin D Deficiency    Subjective          Hypothyroidism      Lyndsay Garcia 58 y.o. female presents for medical management. Since the last visit, she has overall felt well.  She has Hypothyroidism and must update labs to continue treatment and Vitamin D deficiency and labs are at goal >30 ng/mL. She has been compliant with current medications, and I have reviewed them. The patient denies medication side effects. Will refill medications.     The patient had thyroid labs drawn in December 2023.  TSH was normal, free T4 was low, and free T3 was normal.  The patient was given the following message regarding thyroid lab results:    Your TSH is back to normal and free T3 is normal.  Your free T4 is just slightly low.  I do not want to make any changes to your NP thyroid dose at this time. Continue to take 60 mg daily, and lets recheck your thyroid labs in 2 months. Please call our office to make a lab appointment for 2 months.     The patient did not have repeat thyroid labs drawn.  Will check thyroid labs today.  The patient reports being compliant with taking the NP thyroid daily and does not miss any doses.    The patient takes vitamin D 5000 Units daily.  Vitamin D level was normal in March 2023.  Will recheck vitamin D level today.  She is asymptomatic.    We discussed health maintenance today.  The patient declined zoster, influenza, pneumococcal, and Tdap vaccinations.  She also declined lung cancer screening.  I informed the patient I recommend lung cancer screening, and to let me know if she changes her mind.  She will schedule an appointment with me for a Pap smear.            Objective   Vital Signs:   /66   Pulse 58   Temp 97.2 °F (36.2 °C)   Resp 16   Ht 160 cm (62.99\")   Wt 52.6 kg (116 lb)   SpO2 95%   BMI 20.56 kg/m²      BMI is within normal parameters. No other follow-up for BMI required.       Physical Exam  Vitals and nursing note reviewed. "   Constitutional:       Appearance: She is well-developed. She is not toxic-appearing.   HENT:      Head: Normocephalic.   Eyes:      General: No scleral icterus.     Pupils: Pupils are equal, round, and reactive to light.   Neck:      Thyroid: No thyromegaly.      Vascular: No carotid bruit.   Cardiovascular:      Rate and Rhythm: Normal rate and regular rhythm.      Heart sounds: Normal heart sounds.   Pulmonary:      Effort: Pulmonary effort is normal. No respiratory distress.      Breath sounds: Normal breath sounds. No stridor.   Musculoskeletal:         General: No deformity.   Skin:     General: Skin is warm.      Coloration: Skin is not jaundiced.   Neurological:      General: No focal deficit present.      Mental Status: She is alert and oriented to person, place, and time.      Motor: No weakness.   Psychiatric:         Behavior: Behavior normal.         Thought Content: Thought content normal.         Judgment: Judgment normal.          The following data was reviewed by: MELANIE Calderon on 03/18/2024:  T3, free (12/26/2023 09:05)  TSH+Free T4 (12/26/2023 09:05)  Vitamin D,25-Hydroxy (03/21/2023 13:47)              Assessment and Plan      Diagnoses and all orders for this visit:    1. Other specified hypothyroidism (Primary)  Comments:  Previous thyroid labs abnormal  Continue NP thyroid daily  Recheck thyroid labs today  Follow-up in 6 months  Orders:  -     Thyroid (NP THYROID) 60 MG PO tablet; Take 1 tablet by mouth Daily.  Dispense: 90 tablet; Refill: 1  -     TSH+Free T4  -     T3, free  -     Comprehensive metabolic panel  -     Lipid panel  -     CBC and Differential    2. Vitamin D deficiency  Comments:  Continue vitamin D daily  Vitamin D level checked today  Orders:  -     Vitamin D,25-Hydroxy    3. COPD with exacerbation  Comments:  Well-controlled, no exacerbation  Bevespi inhaler daily  Consider smoking cessation  Declined lung cancer screening  Orders:  -     albuterol sulfate   (90 Base) MCG/ACT inhaler; Inhale 2 puffs Every 4 (Four) Hours As Needed for Wheezing or Shortness of Air.  Dispense: 18 g; Refill: 5  -     Glycopyrrolate-Formoterol (Bevespi Aerosphere) 9-4.8 MCG/ACT aerosol; Inhale 2 sprays 2 (Two) Times a Day.  Dispense: 10.7 g; Refill: 5    4. Muscle spasms of neck  Comments:  Tizanidine as needed-use sparingly  Orders:  -     tiZANidine (ZANAFLEX) 4 MG tablet; Take 1 tablet by mouth At Night As Needed for Muscle Spasms.  Dispense: 90 tablet; Refill: 0    5. Allergy, sequela  Comments:  D/C Zyrtec and Xyzal  Start OTC Allegra  Avoid triggers  Orders:  -     fexofenadine (Allegra Allergy) 180 MG tablet; Take 1 tablet by mouth Daily.  Dispense: 90 tablet; Refill: 3            Follow Up     Return in about 6 months (around 9/18/2024) for Next scheduled follow up.    Patient was given instructions and counseling regarding her condition or for health maintenance advice. Please see specific information pulled into the AVS if appropriate.

## 2024-03-18 NOTE — TELEPHONE ENCOUNTER
ATTEMPTED TO WARM TRANSFER BUT NO ANSWER    Caller: Lyndsay Garcia    Relationship to patient: Self    Best call back number: [phone]    Chief complaint: LABS    Type of visit: LABS    Requested date: ASAP

## 2024-03-19 LAB
T3FREE SERPL-MCNC: 3.7 PG/ML (ref 2–4.4)
T4 FREE SERPL-MCNC: 0.91 NG/DL (ref 0.93–1.7)
TSH SERPL DL<=0.005 MIU/L-ACNC: 3.48 UIU/ML (ref 0.27–4.2)

## 2024-03-21 LAB
25(OH)D3+25(OH)D2 SERPL-MCNC: 50.3 NG/ML (ref 30–100)
ALBUMIN SERPL-MCNC: 4.4 G/DL (ref 3.5–5.2)
ALBUMIN/GLOB SERPL: 1.8 G/DL
ALP SERPL-CCNC: 144 U/L (ref 39–117)
ALT SERPL-CCNC: 16 U/L (ref 1–33)
AST SERPL-CCNC: 16 U/L (ref 1–32)
BASOPHILS # BLD AUTO: 0.03 10*3/MM3 (ref 0–0.2)
BASOPHILS NFR BLD AUTO: 0.6 % (ref 0–1.5)
BILIRUB SERPL-MCNC: 0.3 MG/DL (ref 0–1.2)
BUN SERPL-MCNC: 10 MG/DL (ref 6–20)
BUN/CREAT SERPL: 13.9 (ref 7–25)
CALCIUM SERPL-MCNC: 9.5 MG/DL (ref 8.6–10.5)
CHLORIDE SERPL-SCNC: 101 MMOL/L (ref 98–107)
CHOLEST SERPL-MCNC: 143 MG/DL (ref 0–200)
CO2 SERPL-SCNC: 27.3 MMOL/L (ref 22–29)
CREAT SERPL-MCNC: 0.72 MG/DL (ref 0.57–1)
EGFRCR SERPLBLD CKD-EPI 2021: 98.3 ML/MIN/1.73
EOSINOPHIL # BLD AUTO: 0.03 10*3/MM3 (ref 0–0.4)
EOSINOPHIL NFR BLD AUTO: 0.6 % (ref 0.3–6.2)
ERYTHROCYTE [DISTWIDTH] IN BLOOD BY AUTOMATED COUNT: 13.1 % (ref 12.3–15.4)
GLOBULIN SER CALC-MCNC: 2.4 GM/DL
GLUCOSE SERPL-MCNC: 97 MG/DL (ref 65–99)
HCT VFR BLD AUTO: 44.3 % (ref 34–46.6)
HDLC SERPL-MCNC: 36 MG/DL (ref 40–60)
HGB BLD-MCNC: 14.7 G/DL (ref 12–15.9)
IMM GRANULOCYTES # BLD AUTO: 0.01 10*3/MM3 (ref 0–0.05)
IMM GRANULOCYTES NFR BLD AUTO: 0.2 % (ref 0–0.5)
LDLC SERPL CALC-MCNC: 81 MG/DL (ref 0–100)
LYMPHOCYTES # BLD AUTO: 1.87 10*3/MM3 (ref 0.7–3.1)
LYMPHOCYTES NFR BLD AUTO: 39.9 % (ref 19.6–45.3)
MCH RBC QN AUTO: 28.3 PG (ref 26.6–33)
MCHC RBC AUTO-ENTMCNC: 33.2 G/DL (ref 31.5–35.7)
MCV RBC AUTO: 85.2 FL (ref 79–97)
MONOCYTES # BLD AUTO: 0.55 10*3/MM3 (ref 0.1–0.9)
MONOCYTES NFR BLD AUTO: 11.7 % (ref 5–12)
NEUTROPHILS # BLD AUTO: 2.2 10*3/MM3 (ref 1.7–7)
NEUTROPHILS NFR BLD AUTO: 47 % (ref 42.7–76)
NRBC BLD AUTO-RTO: 0 /100 WBC (ref 0–0.2)
PLATELET # BLD AUTO: 313 10*3/MM3 (ref 140–450)
POTASSIUM SERPL-SCNC: 4.3 MMOL/L (ref 3.5–5.2)
PROT SERPL-MCNC: 6.8 G/DL (ref 6–8.5)
RBC # BLD AUTO: 5.2 10*6/MM3 (ref 3.77–5.28)
SODIUM SERPL-SCNC: 140 MMOL/L (ref 136–145)
TRIGL SERPL-MCNC: 147 MG/DL (ref 0–150)
VLDLC SERPL CALC-MCNC: 26 MG/DL (ref 5–40)
WBC # BLD AUTO: 4.69 10*3/MM3 (ref 3.4–10.8)

## 2024-03-26 ENCOUNTER — TELEPHONE (OUTPATIENT)
Dept: FAMILY MEDICINE CLINIC | Facility: CLINIC | Age: 59
End: 2024-03-26

## 2024-03-26 DIAGNOSIS — E03.8 OTHER SPECIFIED HYPOTHYROIDISM: ICD-10-CM

## 2024-03-26 NOTE — TELEPHONE ENCOUNTER
Caller: Lyndsay Garcia    Relationship to patient: Self    Best call back number: [phone]    Patient is needing: PATIENT STATED THAT THE Thyroid (NP THYROID) 60 MG PO tablet  IS ON BACK ORDER AND THEY DO NOT KNOW WHEN THEY WOULD GET IT BACK IN STOCK. PATIENT IS REQUESTING IF THE MEDICATION CAN BE SENT TO [facility].PATIENT HAS ABOUT 5 DAYS LEFT.      PATIENT ALSO STATED SHE IS NEEDING A CALLBACK ABOUT HER LAB RESULTS.    PREFERRED PHARMACY:[facility] - [place] [phone] [facility] [phone] [facility] [phone]

## 2024-03-27 RX ORDER — THYROID 60 MG/1
60 TABLET ORAL DAILY
Qty: 90 TABLET | Refills: 1 | OUTPATIENT
Start: 2024-03-27

## 2024-04-04 DIAGNOSIS — Z88.9 H/O MULTIPLE ALLERGIES: ICD-10-CM

## 2024-04-04 NOTE — TELEPHONE ENCOUNTER
DELETE AFTER REVIEWING: Send the encounter HIGH priority, If patient has less than a 3 day supply. If the patient will run out of medication over the weekend add that information to the additional details line. Send this encounter to the clinical pool.    Caller: JoseLyndsay    Relationship: Self    Best call back number:     [phone] (Mobile)       Requested Prescriptions:   Requested Prescriptions     Pending Prescriptions Disp Refills    levocetirizine (XYZAL) 5 MG tablet 90 tablet 3     Sig: Take 1 tablet by mouth Every Evening.        Pharmacy where request should be sent: [id number] [place] [id number] [facility] - [phone]  - [phone]      Last office visit with prescribing clinician: 3/18/2024   Last telemedicine visit with prescribing clinician: Visit date not found   Next office visit with prescribing clinician: 9/18/2024     Additional details provided by patient: ALLEGRA NOT COVERED BY INSURANCE     Does the patient have less than a 3 day supply:  [] Yes  [x] No    Would you like a call back once the refill request has been completed: [x] Yes [] No    If the office needs to give you a call back, can they leave a voicemail: [x] Yes [] No    Tahira Hall   04/04/24 11:08 EDT

## 2024-04-05 RX ORDER — LEVOCETIRIZINE DIHYDROCHLORIDE 5 MG/1
5 TABLET, FILM COATED ORAL EVERY EVENING
Qty: 90 TABLET | Refills: 1 | OUTPATIENT
Start: 2024-04-05

## 2024-04-09 ENCOUNTER — TELEPHONE (OUTPATIENT)
Dept: FAMILY MEDICINE CLINIC | Facility: CLINIC | Age: 59
End: 2024-04-09
Payer: COMMERCIAL

## 2024-04-09 DIAGNOSIS — T78.40XS ALLERGY, SEQUELA: Primary | ICD-10-CM

## 2024-04-09 RX ORDER — LEVOCETIRIZINE DIHYDROCHLORIDE 5 MG/1
5 TABLET, FILM COATED ORAL EVERY EVENING
Qty: 90 TABLET | Refills: 1 | Status: SHIPPED | OUTPATIENT
Start: 2024-04-09

## 2024-04-09 NOTE — TELEPHONE ENCOUNTER
Provider: BASSAM FIELDS    Caller: DARRIUS RICHARDS    Relationship to Patient: PATIENT    [facility]: [id number] - [facility] [address] AT [facility] [phone]  - [phone]  [phone]     Phone Number: [phone]    Reason for Call: PATIENT IS CALLING TO STATE THAT OVER A WEEK AGO SHE REQUESTED A PRESCRIPTION FOR XAZAL, AND IT HAD NOT BEEN DONE YET.  SHE STATES HER INSURANCE WILL NOT COVER ALLEGRA.  SHE IS REQUESTING A 3 MONTH SUPPLY WITH REFILLS FOR THE XAZAL.    PLEASE ADVISE

## 2024-04-10 NOTE — TELEPHONE ENCOUNTER
Informed patient that her medication request has been sent to her pharmacy in her chart. Patient verbalized understanding

## 2024-06-13 DIAGNOSIS — M62.838 MUSCLE SPASMS OF NECK: ICD-10-CM

## 2024-06-16 RX ORDER — TIZANIDINE 4 MG/1
4 TABLET ORAL NIGHTLY PRN
Qty: 90 TABLET | Refills: 0 | Status: SHIPPED | OUTPATIENT
Start: 2024-06-16

## 2024-06-20 DIAGNOSIS — M62.838 MUSCLE SPASMS OF NECK: ICD-10-CM

## 2024-06-20 RX ORDER — TIZANIDINE 4 MG/1
4 TABLET ORAL NIGHTLY PRN
Qty: 90 TABLET | Refills: 0 | OUTPATIENT
Start: 2024-06-20

## 2024-09-03 ENCOUNTER — OFFICE VISIT (OUTPATIENT)
Dept: FAMILY MEDICINE CLINIC | Facility: CLINIC | Age: 59
End: 2024-09-03
Payer: COMMERCIAL

## 2024-09-03 VITALS
WEIGHT: 117.8 LBS | HEART RATE: 73 BPM | OXYGEN SATURATION: 95 % | HEIGHT: 63 IN | DIASTOLIC BLOOD PRESSURE: 68 MMHG | SYSTOLIC BLOOD PRESSURE: 120 MMHG | BODY MASS INDEX: 20.87 KG/M2 | TEMPERATURE: 98.5 F

## 2024-09-03 DIAGNOSIS — R10.9 LEFT FLANK PAIN: ICD-10-CM

## 2024-09-03 DIAGNOSIS — T78.40XS ALLERGY, SEQUELA: ICD-10-CM

## 2024-09-03 DIAGNOSIS — M62.838 MUSCLE SPASMS OF NECK: ICD-10-CM

## 2024-09-03 DIAGNOSIS — Z12.4 ENCOUNTER FOR PAPANICOLAOU SMEAR FOR CERVICAL CANCER SCREENING: Primary | ICD-10-CM

## 2024-09-03 DIAGNOSIS — Z12.31 ENCOUNTER FOR SCREENING MAMMOGRAM FOR MALIGNANT NEOPLASM OF BREAST: ICD-10-CM

## 2024-09-03 DIAGNOSIS — R35.0 URINARY FREQUENCY: ICD-10-CM

## 2024-09-03 DIAGNOSIS — E03.9 HYPOTHYROIDISM, UNSPECIFIED TYPE: ICD-10-CM

## 2024-09-03 DIAGNOSIS — R30.0 DYSURIA: ICD-10-CM

## 2024-09-03 LAB
BILIRUB BLD-MCNC: NEGATIVE MG/DL
CLARITY, POC: ABNORMAL
COLOR UR: YELLOW
EXPIRATION DATE: ABNORMAL
GLUCOSE UR STRIP-MCNC: NEGATIVE MG/DL
KETONES UR QL: NEGATIVE
LEUKOCYTE EST, POC: NEGATIVE
Lab: ABNORMAL
NITRITE UR-MCNC: NEGATIVE MG/ML
PH UR: 6 [PH] (ref 5–8)
PROT UR STRIP-MCNC: NEGATIVE MG/DL
RBC # UR STRIP: NEGATIVE /UL
SP GR UR: 1 (ref 1–1.03)
UROBILINOGEN UR QL: ABNORMAL

## 2024-09-03 PROCEDURE — 99396 PREV VISIT EST AGE 40-64: CPT

## 2024-09-03 PROCEDURE — 99214 OFFICE O/P EST MOD 30 MIN: CPT

## 2024-09-03 PROCEDURE — 81003 URINALYSIS AUTO W/O SCOPE: CPT

## 2024-09-03 RX ORDER — THYROID 60 MG/1
60 TABLET ORAL DAILY
Qty: 90 TABLET | Refills: 1 | Status: SHIPPED | OUTPATIENT
Start: 2024-09-03 | End: 2024-09-05 | Stop reason: DRUGHIGH

## 2024-09-03 RX ORDER — LEVOCETIRIZINE DIHYDROCHLORIDE 5 MG/1
5 TABLET, FILM COATED ORAL EVERY EVENING
Qty: 90 TABLET | Refills: 3 | Status: SHIPPED | OUTPATIENT
Start: 2024-09-03

## 2024-09-03 NOTE — PROGRESS NOTES
Chief Complaint  Gynecologic Exam    Subjective          Gynecologic Exam  The patient's primary symptoms include pelvic pain (pressure, not pain). The patient's pertinent negatives include no vaginal discharge. Associated symptoms include dysuria, flank pain (left-sided) and frequency. Pertinent negatives include no abdominal pain, back pain, chills, constipation, diarrhea, fever, hematuria, nausea, rash, urgency or vomiting.     Lyndsay Garcia 59 y.o. female is  2, Para 2 who presents for annual exam. The patient has no complaints today. The patient is sexually active. GYN screening history: last pap: approximate date September or 2023 and was normal and last mammogram: approximate date 2023 and was normal. The patient is not taking hormone replacement therapy. Patient denies post-menopausal vaginal bleeding.. The patient participates in regular exercise: yes.      She reports additional complaints. She reports dysuria, urinary frequency, left flank pain, and pelvic pressure. Symptom onset was 3 days ago. No fever, chills, and hematuria.     History of abnormal Pap smear: yes - in her 20's.  Family history of uterine or ovarian cancer: no  Family history of colon caner:  no  History of abnormal mammogram: yes - once and follow up mammogram was normal.  Family history of breast cancer: no  Colonoscopy history:   Cologuard negative on 2019. Declines a colonoscopy and a repeat cologuard.   DEXA scan:  has not started screening yet due to age.    She declines STD testing.     We discussed health maintenance today.  She agreed to breast cancer screening with a mammogram.  She declined a colonoscopy and a repeat Cologuard test.        Review of Systems   Constitutional:  Negative for chills, fatigue and fever.   HENT:  Negative for trouble swallowing.    Eyes:  Negative for visual disturbance.   Respiratory:  Negative for cough, chest tightness and shortness of breath.    Cardiovascular:   "Negative for chest pain and palpitations.   Gastrointestinal:  Negative for abdominal pain, constipation, diarrhea, nausea and vomiting.   Genitourinary:  Positive for dysuria, flank pain (left-sided), frequency and pelvic pain (pressure, not pain). Negative for decreased urine volume, difficulty urinating, genital sores, hematuria, urgency, vaginal bleeding, vaginal discharge and vaginal pain.   Musculoskeletal:  Negative for back pain.   Skin:  Negative for rash.   Neurological:  Negative for dizziness, syncope and light-headedness.   Psychiatric/Behavioral:  Negative for behavioral problems and sleep disturbance.         Objective   Vital Signs:   /68 (BP Location: Left arm, Patient Position: Sitting, Cuff Size: Small Adult)   Pulse 73   Temp 98.5 °F (36.9 °C) (Oral)   Ht 160 cm (62.99\")   Wt 53.4 kg (117 lb 12.8 oz)   SpO2 95%   BMI 20.87 kg/m²      BMI is within normal parameters. No other follow-up for BMI required.       Physical Exam  Vitals and nursing note reviewed.   Constitutional:       Appearance: She is well-developed. She is not toxic-appearing.   HENT:      Head: Normocephalic.   Eyes:      General: No scleral icterus.     Pupils: Pupils are equal, round, and reactive to light.   Neck:      Thyroid: No thyromegaly.   Cardiovascular:      Rate and Rhythm: Normal rate.   Pulmonary:      Effort: Pulmonary effort is normal. No respiratory distress.   Chest:      Chest wall: No mass, swelling, tenderness or edema.   Breasts:     Breasts are symmetrical.      Right: Mass (tiny lump) present. No swelling, bleeding, inverted nipple, nipple discharge, skin change or tenderness.      Left: No swelling, bleeding, inverted nipple, mass, nipple discharge, skin change or tenderness.          Comments: Tiny lump palpated at the 4 o'clock position of the lower inner quadrant of the right breast resembles a cyst, and is non-tender to palpation.  Abdominal:      Tenderness: There is no abdominal " tenderness. There is no guarding or rebound.   Genitourinary:     General: Normal vulva.      Exam position: Lithotomy position.      Pubic Area: No rash.       Labia:         Right: No rash, tenderness, lesion or injury.         Left: No rash, tenderness, lesion or injury.       Urethra: No prolapse.      Vagina: No vaginal discharge, erythema, tenderness, bleeding or lesions.      Cervix: No cervical motion tenderness, discharge, friability, erythema or cervical bleeding.      Uterus: Not deviated, not enlarged, not tender and no uterine prolapse.       Adnexa:         Right: No mass, tenderness or fullness.          Left: No mass, tenderness or fullness.        Rectum: Normal.      Comments: Pap smear specimen obtained without difficulty. No cervical or vaginal bleeding.  Musculoskeletal:         General: No deformity.   Lymphadenopathy:      Upper Body:      Right upper body: No supraclavicular, axillary or pectoral adenopathy.      Left upper body: No supraclavicular, axillary or pectoral adenopathy.   Skin:     General: Skin is warm.      Coloration: Skin is not jaundiced.   Neurological:      General: No focal deficit present.      Mental Status: She is alert and oriented to person, place, and time.   Psychiatric:         Behavior: Behavior normal.         Thought Content: Thought content normal.         Judgment: Judgment normal.          The following data was reviewed by: MELANIE Calderon on 09/03/2024:  T3, free (03/18/2024 11:25)  TSH+Free T4 (03/18/2024 11:25)  Comprehensive metabolic panel (03/20/2024 08:12)   POCT urinalysis dipstick, automated (09/03/2024 15:42)              Assessment and Plan      Assessment & Plan  Encounter for Papanicolaou smear for cervical cancer screening  Pap smear specimen obtained without difficulty  Hypothyroidism, unspecified type  Continue NP thyroid daily  Thyroid labs today  Follow-up in 6 months  Muscle spasms of neck  Chronic issue  Tizanidine as needed-use  sparingly  Allergy, sequela  Well-controlled on current regimen  Xyzal as directed  Avoid triggers  Dysuria  Urine dip in office is negative for UTI today  Urine culture ordered  Increase water intake  Maintain good urinary hygiene  Return if no improvement  Left flank pain  Urine dip in office is negative for UTI today  Urine culture ordered  Increase water intake  Maintain good urinary hygiene  Return if no improvement  Urinary frequency  Urine dip in office is negative for UTI today  Urine culture ordered  Increase water intake  Maintain good urinary hygiene  Return if no improvement  Encounter for screening mammogram for malignant neoplasm of breast  Screening mammogram ordered    Orders Placed This Encounter   Procedures    Urine Culture - Urine, Urine, Clean Catch    Mammo screening digital tomosynthesis bilateral w CAD    TSH+Free T4    T3, free    POCT urinalysis dipstick, automated    IgP, Aptima HPV     New Medications Ordered This Visit   Medications    Thyroid (NP THYROID) 60 MG PO tablet     Sig: Take 1 tablet by mouth Daily.     Dispense:  90 tablet     Refill:  1    tiZANidine (ZANAFLEX) 4 MG tablet     Sig: Take 1 tablet by mouth At Night As Needed for Muscle Spasms.     Dispense:  90 tablet     Refill:  0    levocetirizine (XYZAL) 5 MG tablet     Sig: Take 1 tablet by mouth Every Evening.     Dispense:  90 tablet     Refill:  3              Follow Up     Return in about 6 months (around 3/3/2025) for Next scheduled follow up.    Patient was given instructions and counseling regarding her condition or for health maintenance advice. Please see specific information pulled into the AVS if appropriate.

## 2024-09-04 LAB
T3FREE SERPL-MCNC: 3.4 PG/ML (ref 2–4.4)
T4 FREE SERPL-MCNC: 0.77 NG/DL (ref 0.92–1.68)
TSH SERPL DL<=0.005 MIU/L-ACNC: 2.72 UIU/ML (ref 0.27–4.2)

## 2024-09-05 DIAGNOSIS — E03.9 HYPOTHYROIDISM, UNSPECIFIED TYPE: Primary | ICD-10-CM

## 2024-09-05 LAB
CYTOLOGIST CVX/VAG CYTO: NORMAL
CYTOLOGY CVX/VAG DOC CYTO: NORMAL
CYTOLOGY CVX/VAG DOC THIN PREP: NORMAL
DX ICD CODE: NORMAL
HPV I/H RISK 4 DNA CVX QL PROBE+SIG AMP: NEGATIVE
Lab: NORMAL
OTHER STN SPEC: NORMAL
STAT OF ADQ CVX/VAG CYTO-IMP: NORMAL

## 2024-09-05 RX ORDER — THYROID 90 MG/1
90 TABLET ORAL DAILY
Qty: 60 TABLET | Refills: 0 | Status: SHIPPED | OUTPATIENT
Start: 2024-09-05

## 2024-09-06 LAB
BACTERIA UR CULT: ABNORMAL
BACTERIA UR CULT: ABNORMAL
OTHER ANTIBIOTIC SUSC ISLT: ABNORMAL

## 2024-09-08 DIAGNOSIS — N30.90 KLEBSIELLA CYSTITIS: Primary | ICD-10-CM

## 2024-09-08 DIAGNOSIS — B96.1 KLEBSIELLA CYSTITIS: Primary | ICD-10-CM

## 2024-09-08 RX ORDER — CEFDINIR 300 MG/1
300 CAPSULE ORAL 2 TIMES DAILY
Qty: 14 CAPSULE | Refills: 0 | Status: SHIPPED | OUTPATIENT
Start: 2024-09-08 | End: 2024-09-15

## 2024-09-14 DIAGNOSIS — E03.8 OTHER SPECIFIED HYPOTHYROIDISM: ICD-10-CM

## 2024-09-16 RX ORDER — THYROID 60 MG/1
60 TABLET ORAL DAILY
Qty: 90 TABLET | Refills: 1 | OUTPATIENT
Start: 2024-09-16

## 2024-09-23 ENCOUNTER — TELEPHONE (OUTPATIENT)
Dept: FAMILY MEDICINE CLINIC | Facility: CLINIC | Age: 59
End: 2024-09-23
Payer: COMMERCIAL

## 2024-09-26 DIAGNOSIS — F17.200 TOBACCO DEPENDENCE: Primary | ICD-10-CM

## 2024-09-26 RX ORDER — VARENICLINE TARTRATE 1 MG/1
1 TABLET, FILM COATED ORAL 2 TIMES DAILY
Qty: 56 TABLET | Refills: 4 | Status: SHIPPED | OUTPATIENT
Start: 2024-10-24 | End: 2025-03-13

## 2024-09-26 RX ORDER — VARENICLINE TARTRATE 0.5 (11)-1
KIT ORAL
Qty: 1 EACH | Refills: 0 | Status: SHIPPED | OUTPATIENT
Start: 2024-09-26 | End: 2024-10-24

## 2024-10-15 DIAGNOSIS — T78.40XS ALLERGY, SEQUELA: ICD-10-CM

## 2024-10-16 ENCOUNTER — OFFICE VISIT (OUTPATIENT)
Dept: FAMILY MEDICINE CLINIC | Facility: CLINIC | Age: 59
End: 2024-10-16
Payer: COMMERCIAL

## 2024-10-16 VITALS
RESPIRATION RATE: 18 BRPM | OXYGEN SATURATION: 97 % | WEIGHT: 115 LBS | TEMPERATURE: 97.2 F | HEIGHT: 63 IN | HEART RATE: 71 BPM | BODY MASS INDEX: 20.38 KG/M2 | DIASTOLIC BLOOD PRESSURE: 78 MMHG | SYSTOLIC BLOOD PRESSURE: 126 MMHG

## 2024-10-16 DIAGNOSIS — E03.9 HYPOTHYROIDISM, UNSPECIFIED TYPE: ICD-10-CM

## 2024-10-16 DIAGNOSIS — J40 BRONCHITIS: Primary | ICD-10-CM

## 2024-10-16 DIAGNOSIS — R06.02 SHORTNESS OF BREATH: ICD-10-CM

## 2024-10-16 DIAGNOSIS — R06.2 WHEEZING: ICD-10-CM

## 2024-10-16 PROCEDURE — 99214 OFFICE O/P EST MOD 30 MIN: CPT

## 2024-10-16 RX ORDER — THYROID 90 MG/1
90 TABLET ORAL DAILY
Qty: 90 TABLET | Refills: 1 | Status: SHIPPED | OUTPATIENT
Start: 2024-10-16 | End: 2024-10-20 | Stop reason: DRUGHIGH

## 2024-10-16 RX ORDER — AZITHROMYCIN 250 MG/1
TABLET, FILM COATED ORAL
Qty: 6 TABLET | Refills: 0 | Status: SHIPPED | OUTPATIENT
Start: 2024-10-16 | End: 2024-10-21

## 2024-10-16 RX ORDER — PREDNISONE 20 MG/1
TABLET ORAL
Qty: 20 TABLET | Refills: 0 | Status: SHIPPED | OUTPATIENT
Start: 2024-10-16

## 2024-10-16 RX ORDER — IPRATROPIUM BROMIDE AND ALBUTEROL SULFATE 2.5; .5 MG/3ML; MG/3ML
3 SOLUTION RESPIRATORY (INHALATION) EVERY 4 HOURS PRN
Qty: 60 ML | Refills: 5 | Status: SHIPPED | OUTPATIENT
Start: 2024-10-16

## 2024-10-16 NOTE — ASSESSMENT & PLAN NOTE
-Take medication as prescribed.  -Monitor for fever and take Tylenol as needed.  Drink plenty of fluids and get plenty of rest.  -Use cool-mist humidifier as needed.  -Seek immediate medical attention for fever unrelieved by Tylenol, chest pain, shortness of breath, decreased oxygen saturations, sharp pain with breathing, or any other worsening signs or symptoms.  -Call 911 and go to the nearest emergency department if any symptoms worsen.  Orders:    azithromycin (Zithromax Z-Abdirizak) 250 MG tablet; Take 2 tablets the first day, then 1 tablet daily for 4 days.    predniSONE (DELTASONE) 20 MG tablet; 1 PO TID X 3 days, then 1 PO BID X 4 days, then 1 PO daily X 3 days with food

## 2024-10-16 NOTE — ASSESSMENT & PLAN NOTE
Orders:    ipratropium-albuterol (DUO-NEB) 0.5-2.5 mg/3 ml nebulizer; Take 3 mL by nebulization Every 4 (Four) Hours As Needed for Wheezing or Shortness of Air.    azithromycin (Zithromax Z-Abdirizak) 250 MG tablet; Take 2 tablets the first day, then 1 tablet daily for 4 days.    predniSONE (DELTASONE) 20 MG tablet; 1 PO TID X 3 days, then 1 PO BID X 4 days, then 1 PO daily X 3 days with food

## 2024-10-16 NOTE — LETTER
October 16, 2024     Patient: Lyndsay Garcia   YOB: 1965   Date of Visit: 10/16/2024       To Whom It May Concern:    It is my medical opinion that Lyndsay Garcia should not carry cases of heavy equipment to and from the hospital due to chronic shortness of breath. Please contact me should you have any questions.          Sincerely,        MELANIE Calderon    CC: No Recipients

## 2024-10-16 NOTE — PROGRESS NOTES
"Chief Complaint  Nasal Congestion (sinus) and Cough    Subjective          Cough  Associated symptoms include postnasal drip, shortness of breath and wheezing. Pertinent negatives include no chest pain, chills, ear pain, fever, rash or rhinorrhea.     Lyndsay Garcia 59 y.o. female presents for evaluation of sinus pressure and congestion and cough. Symptoms include congestion, post nasal drip, productive cough, shortness of breath, wheezing, and deep bronchial sounding cough. Onset of symptoms was 9 days ago, stable since that time. Patient denies pleuritic chest pain, fever, diarrhea, vomiting, chills, sinus pain, high fever. Evaluation to date: none. Treatment to date: Albuterol MDI and Albuterol per nebulizer.         Review of Systems   Constitutional:  Negative for chills, fatigue and fever.   HENT:  Positive for congestion and postnasal drip. Negative for ear pain, rhinorrhea, sinus pressure and trouble swallowing.    Eyes:  Negative for visual disturbance.   Respiratory:  Positive for cough, shortness of breath and wheezing. Negative for chest tightness.    Cardiovascular:  Negative for chest pain and palpitations.   Gastrointestinal:  Negative for abdominal pain, constipation, diarrhea, nausea and vomiting.   Genitourinary:  Negative for dysuria.   Musculoskeletal:  Negative for back pain.   Skin:  Negative for rash.   Neurological:  Negative for dizziness, syncope and light-headedness.        Objective   Vital Signs:   /78   Pulse 71   Temp 97.2 °F (36.2 °C) (Temporal)   Resp 18   Ht 160 cm (62.99\")   Wt 52.2 kg (115 lb)   SpO2 97%   BMI 20.38 kg/m²      BMI is within normal parameters. No other follow-up for BMI required.       Physical Exam  Vitals and nursing note reviewed.   Constitutional:       General: She is not in acute distress.     Appearance: She is well-developed. She is ill-appearing. She is not toxic-appearing.   HENT:      Head: Normocephalic and atraumatic. Hair is normal.      " Right Ear: External ear normal. No drainage, swelling or tenderness.      Left Ear: External ear normal. No drainage, swelling or tenderness.      Nose: Mucosal edema and congestion present. No rhinorrhea.      Right Sinus: No maxillary sinus tenderness or frontal sinus tenderness.      Left Sinus: No maxillary sinus tenderness or frontal sinus tenderness.      Mouth/Throat:      Mouth: No oral lesions.      Pharynx: Uvula midline. Posterior oropharyngeal erythema present. No pharyngeal swelling, oropharyngeal exudate, uvula swelling or postnasal drip.      Tonsils: No tonsillar exudate or tonsillar abscesses.   Eyes:      General: No scleral icterus.        Right eye: No discharge.         Left eye: No discharge.      Conjunctiva/sclera: Conjunctivae normal.      Pupils: Pupils are equal, round, and reactive to light.   Neck:      Thyroid: No thyromegaly.   Cardiovascular:      Rate and Rhythm: Normal rate and regular rhythm.      Heart sounds: Normal heart sounds.   Pulmonary:      Effort: Pulmonary effort is normal. No respiratory distress.      Breath sounds: Normal breath sounds. No stridor. Wheezing present. No decreased breath sounds, rhonchi or rales.   Musculoskeletal:         General: No deformity.      Cervical back: Normal range of motion and neck supple.   Lymphadenopathy:      Cervical: No cervical adenopathy.   Skin:     General: Skin is warm and dry.      Coloration: Skin is not jaundiced.      Findings: No rash.   Neurological:      General: No focal deficit present.      Mental Status: She is alert and oriented to person, place, and time.      Motor: No abnormal muscle tone.   Psychiatric:         Mood and Affect: Mood normal.         Behavior: Behavior normal.         Thought Content: Thought content normal.         Judgment: Judgment normal.                         Assessment and Plan      Assessment & Plan  Bronchitis  -Take medication as prescribed.  -Monitor for fever and take Tylenol as  needed.  Drink plenty of fluids and get plenty of rest.  -Use cool-mist humidifier as needed.  -Seek immediate medical attention for fever unrelieved by Tylenol, chest pain, shortness of breath, decreased oxygen saturations, sharp pain with breathing, or any other worsening signs or symptoms.  -Call 911 and go to the nearest emergency department if any symptoms worsen.  Orders:    azithromycin (Zithromax Z-Abdirizak) 250 MG tablet; Take 2 tablets the first day, then 1 tablet daily for 4 days.    predniSONE (DELTASONE) 20 MG tablet; 1 PO TID X 3 days, then 1 PO BID X 4 days, then 1 PO daily X 3 days with food    Shortness of breath    Orders:    ipratropium-albuterol (DUO-NEB) 0.5-2.5 mg/3 ml nebulizer; Take 3 mL by nebulization Every 4 (Four) Hours As Needed for Wheezing or Shortness of Air.    azithromycin (Zithromax Z-Abdirizak) 250 MG tablet; Take 2 tablets the first day, then 1 tablet daily for 4 days.    predniSONE (DELTASONE) 20 MG tablet; 1 PO TID X 3 days, then 1 PO BID X 4 days, then 1 PO daily X 3 days with food    Wheezing    Orders:    ipratropium-albuterol (DUO-NEB) 0.5-2.5 mg/3 ml nebulizer; Take 3 mL by nebulization Every 4 (Four) Hours As Needed for Wheezing or Shortness of Air.    azithromycin (Zithromax Z-Abdirizak) 250 MG tablet; Take 2 tablets the first day, then 1 tablet daily for 4 days.    predniSONE (DELTASONE) 20 MG tablet; 1 PO TID X 3 days, then 1 PO BID X 4 days, then 1 PO daily X 3 days with food    Hypothyroidism, unspecified type    Orders:    Thyroid (NP THYROID) 90 MG PO tablet; Take 1 tablet by mouth Daily.             Follow Up     Return if symptoms worsen or fail to improve.    Patient was given instructions and counseling regarding her condition or for health maintenance advice. Please see specific information pulled into the AVS if appropriate.

## 2024-10-17 RX ORDER — LEVOCETIRIZINE DIHYDROCHLORIDE 5 MG/1
5 TABLET, FILM COATED ORAL EVERY EVENING
Qty: 90 TABLET | Refills: 3 | Status: SHIPPED | OUTPATIENT
Start: 2024-10-17

## 2024-10-20 DIAGNOSIS — E03.9 HYPOTHYROIDISM, UNSPECIFIED TYPE: Primary | ICD-10-CM

## 2024-10-20 RX ORDER — THYROID 60 MG/1
60 TABLET ORAL DAILY
Qty: 60 TABLET | Refills: 0 | Status: SHIPPED | OUTPATIENT
Start: 2024-10-20

## 2024-10-22 ENCOUNTER — TELEPHONE (OUTPATIENT)
Dept: FAMILY MEDICINE CLINIC | Facility: CLINIC | Age: 59
End: 2024-10-22
Payer: COMMERCIAL

## 2024-10-22 NOTE — TELEPHONE ENCOUNTER
Caller: Lyndsay Garcia    Relationship to patient: Self    Best call back number: [phone]    Patient is needing:     SHE WOULD LIKE A CALL FROM BASSAM VIA TO GET HER MOST RECENT LAB RESULTS.     SHE WOULD A CALL FROM BASSAM AND NOT HER MA OR ANYONE ELSE.            No significant past surgical history

## 2024-10-29 ENCOUNTER — TELEPHONE (OUTPATIENT)
Dept: FAMILY MEDICINE CLINIC | Facility: CLINIC | Age: 59
End: 2024-10-29

## 2024-10-29 RX ORDER — NYSTATIN 100000 [USP'U]/ML
500000 SUSPENSION ORAL 4 TIMES DAILY
Qty: 473 ML | Refills: 0 | Status: SHIPPED | OUTPATIENT
Start: 2024-10-29 | End: 2024-10-31 | Stop reason: SDUPTHER

## 2024-10-29 NOTE — TELEPHONE ENCOUNTER
Caller: Lyndsay Garcia    Relationship: Self    Best call back number:[phone]     What medication are you requesting:      What are your current symptoms:      How long have you been experiencing symptoms:      Have you had these symptoms before:    [] Yes  [] No    Have you been treated for these symptoms before:   [] Yes  [] No    If a prescription is needed, what is your preferred pharmacy and phone number: [facility] #[id number] [place], [place] - [address] AT [facility] [facility] - [phone] [facility] [phone] FX     Additional notes: PATIENT FINISHED HER STEROID YESTERDAY AND HER MOUTH IS ON FIRE, WHICH IS THRUSH, HARD TO SWALLOW.  PATIENT WANTS TO KNOW IF MELANIE HAGER CAN CALL IN THE LIQUID FORM MEDICATION FOR IT TO THE PHARMACY.

## 2024-10-31 DIAGNOSIS — B37.0 ORAL THRUSH: Primary | ICD-10-CM

## 2024-10-31 RX ORDER — NYSTATIN 100000 [USP'U]/ML
500000 SUSPENSION ORAL 4 TIMES DAILY
Qty: 473 ML | Refills: 1 | Status: SHIPPED | OUTPATIENT
Start: 2024-10-31

## 2024-11-04 ENCOUNTER — TELEPHONE (OUTPATIENT)
Dept: FAMILY MEDICINE CLINIC | Facility: CLINIC | Age: 59
End: 2024-11-04
Payer: COMMERCIAL

## 2024-11-04 NOTE — TELEPHONE ENCOUNTER
Pt called needing mammo order sent to [facility], and wants a copy mailed to her. Order faxed with confirmation to [phone]. Other will be mailed out today.

## 2024-11-13 ENCOUNTER — TELEPHONE (OUTPATIENT)
Dept: FAMILY MEDICINE CLINIC | Facility: CLINIC | Age: 59
End: 2024-11-13
Payer: COMMERCIAL

## 2024-11-13 NOTE — TELEPHONE ENCOUNTER
PT called in stating she spoke with KATHERYN Kaur and was told that she was having her mammo orders mailed to her but she has not received them in the mail. I confirmed with PT that KATHERYN Kaur has faxed over the orders and mailed them on 11/24/24 and I also let her know the [facility] received the attachment for her MAMMO orders on 9/9/24 with a successful fax. Pt provided phone number to be faxed directly to her [phone]. Advised I would hand over to KATHERYN Winn to take care of it.

## 2024-11-21 DIAGNOSIS — E03.9 HYPOTHYROIDISM, UNSPECIFIED TYPE: ICD-10-CM

## 2024-11-23 LAB
T3FREE SERPL-MCNC: 7.6 PG/ML (ref 2–4.4)
T4 FREE SERPL-MCNC: 1.1 NG/DL (ref 0.92–1.68)
TSH SERPL DL<=0.005 MIU/L-ACNC: 0.06 UIU/ML (ref 0.27–4.2)

## 2024-11-26 DIAGNOSIS — E03.9 HYPOTHYROIDISM, UNSPECIFIED TYPE: Primary | ICD-10-CM

## 2024-12-09 ENCOUNTER — TELEPHONE (OUTPATIENT)
Dept: FAMILY MEDICINE CLINIC | Facility: CLINIC | Age: 59
End: 2024-12-09

## 2024-12-09 NOTE — TELEPHONE ENCOUNTER
Caller: Lyndsay Garcia    Relationship to patient: Self    Best call back number: [phone]       Type of visit: LAB VISIT    Requested date: 01/15/24      Additional notes:UNABLE TO WARM TRANSFER, PLEASE CALL BACK TO SCHEDULE

## 2025-01-27 DIAGNOSIS — E03.9 HYPOTHYROIDISM, UNSPECIFIED TYPE: Primary | ICD-10-CM

## 2025-01-27 RX ORDER — THYROID 90 MG/1
90 TABLET ORAL DAILY
Qty: 30 TABLET | Refills: 1 | Status: SHIPPED | OUTPATIENT
Start: 2025-01-27

## 2025-03-19 ENCOUNTER — OFFICE VISIT (OUTPATIENT)
Dept: CARDIOLOGY | Facility: CLINIC | Age: 60
End: 2025-03-19
Payer: COMMERCIAL

## 2025-03-19 VITALS
DIASTOLIC BLOOD PRESSURE: 68 MMHG | SYSTOLIC BLOOD PRESSURE: 114 MMHG | HEART RATE: 63 BPM | HEIGHT: 62 IN | WEIGHT: 120 LBS | BODY MASS INDEX: 22.08 KG/M2

## 2025-03-19 DIAGNOSIS — R06.09 DYSPNEA ON EXERTION: ICD-10-CM

## 2025-03-19 DIAGNOSIS — Z72.0 TOBACCO ABUSE: ICD-10-CM

## 2025-03-19 DIAGNOSIS — I49.3 PVC (PREMATURE VENTRICULAR CONTRACTION): Primary | ICD-10-CM

## 2025-03-19 PROCEDURE — 99214 OFFICE O/P EST MOD 30 MIN: CPT | Performed by: NURSE PRACTITIONER

## 2025-03-19 PROCEDURE — 93000 ELECTROCARDIOGRAM COMPLETE: CPT | Performed by: NURSE PRACTITIONER

## 2025-03-19 NOTE — PROGRESS NOTES
Date of Office Visit: 2025  Encounter Provider: MELANIE Cruz  Place of Service: [facility] CARDIOLOGY  Patient Name: Lyndsay Garcia  :1965    Chief Complaint   Patient presents with    Shortness of Breath   :     HPI: Lyndsay Garcia is a 59 y.o. female patient of Dr. Beebe.  In , she was evaluated for dyspnea and abnormal EKG demonstrating PVCs.  Holter monitor demonstrated a 4.3% PVC burden.  She was started on atenolol.  She has a longstanding history of tobacco abuse.      In 2023, the atenolol was discontinued secondary to low blood pressure.    She was last seen in the office in 2024 at which time she reported chronic dyspnea on exertion since having COVID several years prior.  She also continued to smoke a half pack to 1 pack of cigarettes a day.  She was referred to pulmonary.  Otherwise, she was advised to follow-up in 1 year.    She has been doing okay.  She does report continued dyspnea on exertion that seems worse.  She is using her inhalers more.  She is still smoking 1/2 pack/day.  She denies any chest pain, palpitations, edema, dizziness, or syncope.    Past Medical History:   Diagnosis Date    Abdominal pain     Abnormal blood chemistry     Abnormal Pap smear of cervix     Acute maxillary sinusitis     Allergic rhinitis     Chronic constipation     Chronic obstructive pulmonary disease     Constipation     Fatigue     Hematuria     Hypothyroidism     IBS (irritable bowel syndrome)     Increased abdominal girth     Migraine with aura, intractable     Neck pain     Perimenopause     Vitamin D deficiency        Past Surgical History:   Procedure Laterality Date    CATARACT EXTRACTION, BILATERAL  2018       Social History     Socioeconomic History    Marital status:    Tobacco Use    Smoking status: Every Day     Current packs/day: 0.50     Average packs/day: 0.5 packs/day for 45.2 years (22.6 ttl pk-yrs)     Types:  Cigarettes     Start date: 1980     Passive exposure: Current    Smokeless tobacco: Never   Vaping Use    Vaping status: Never Used   Substance and Sexual Activity    Alcohol use: No     Comment: caffeine use    Drug use: No    Sexual activity: Yes     Partners: Male     Birth control/protection: None       Family History   Problem Relation Age of Onset    Diabetes Mother     Heart disease Mother     Hypertension Mother     Cancer Father     Diabetes Sister     Diabetes Sister     Kidney disease Sister        Review of Systems   Constitutional: Negative.   Cardiovascular:  Positive for dyspnea on exertion. Negative for chest pain, leg swelling, orthopnea, paroxysmal nocturnal dyspnea and syncope.   Respiratory: Negative.     Hematologic/Lymphatic: Negative for bleeding problem.   Musculoskeletal:  Negative for falls.   Gastrointestinal:  Negative for melena.   Neurological:  Negative for dizziness and light-headedness.       Allergies   Allergen Reactions    Sulfa Antibiotics Itching         Current Outpatient Medications:     albuterol sulfate  (90 Base) MCG/ACT inhaler, Inhale 2 puffs Every 4 (Four) Hours As Needed for Wheezing or Shortness of Air., Disp: 18 g, Rfl: 5    Glycopyrrolate-Formoterol (Bevespi Aerosphere) 9-4.8 MCG/ACT aerosol, Inhale 2 sprays 2 (Two) Times a Day., Disp: 10.7 g, Rfl: 5    ipratropium-albuterol (DUO-NEB) 0.5-2.5 mg/3 ml nebulizer, Take 3 mL by nebulization Every 4 (Four) Hours As Needed for Wheezing or Shortness of Air., Disp: 60 mL, Rfl: 5    Thyroid (NP THYROID) 90 MG PO tablet, Take 1 tablet by mouth Daily., Disp: 30 tablet, Rfl: 1    tiZANidine (ZANAFLEX) 4 MG tablet, Take 1 tablet by mouth At Night As Needed for Muscle Spasms., Disp: 90 tablet, Rfl: 0    vitamin B-12 (CYANOCOBALAMIN) 1000 MCG tablet, Take 1 tablet by mouth Daily., Disp: , Rfl:     VITAMIN D PO, Take 5,000 Units by mouth Daily., Disp: , Rfl:       Objective:     Vitals:    03/19/25 1402   BP: 114/68   Pulse:  "63   Weight: 54.4 kg (120 lb)   Height: 157.5 cm (62\")     Body mass index is 21.95 kg/m².    PHYSICAL EXAM:    Neck:      Vascular: No JVD.   Pulmonary:      Effort: Pulmonary effort is normal.      Breath sounds: Normal breath sounds.   Cardiovascular:      Normal rate. Regular rhythm.      Murmurs: There is no murmur.      No gallop.  No click. No rub.   Pulses:     Intact distal pulses.           ECG 12 Lead    Date/Time: 3/19/2025 2:16 PM  Performed by: Claudia Bennett APRN    Authorized by: Claudia Bennett APRN  Comparison: compared with previous ECG from 2/21/2024  Similar to previous ECG  Rhythm: sinus rhythm  Rate: normal  BPM: 63            Assessment:       Diagnosis Plan   1. PVC (premature ventricular contraction)  ECG 12 Lead      2. Dyspnea on exertion        3. Tobacco abuse          Orders Placed This Encounter   Procedures    ECG 12 Lead     This order was created via procedure documentation     Release to patient:   Routine Release [[id number]]          Plan:       1.  PVCs.  Previously on atenolol which was discontinued secondary to low blood pressure.  No further issues      2.  Dyspnea on exertion.  She tells me she was diagnosed with COPD several years ago by a pulmonologist at [facility].  Unfortunately she never heard from anyone following last year's pulmonary referral.  She does have several risk factors for coronary disease including a family history and tobacco abuse.  We discussed stress testing.  She is not confident she could tolerate the treadmill.  She is also concerned about the cost.  We also discussed a CT coronary calcium score.  Ultimately, she would like to think about it.  She will let me know.      3.  Tobacco abuse.  She has tried multiple things to help her quit none of which have been successful.      I told her to call me once she made up her mind about the testing.      As always, it has been a pleasure to participate in your patient's care.      Sincerely, "         MELANIE Reinoso

## 2025-03-20 ENCOUNTER — RESULTS FOLLOW-UP (OUTPATIENT)
Dept: FAMILY MEDICINE CLINIC | Facility: CLINIC | Age: 60
End: 2025-03-20
Payer: COMMERCIAL

## 2025-03-20 DIAGNOSIS — E03.9 HYPOTHYROIDISM, UNSPECIFIED TYPE: ICD-10-CM

## 2025-03-20 RX ORDER — THYROID 90 MG/1
90 TABLET ORAL EVERY MORNING
Qty: 90 TABLET | Refills: 1 | Status: SHIPPED | OUTPATIENT
Start: 2025-03-20

## 2025-03-21 ENCOUNTER — TELEPHONE (OUTPATIENT)
Dept: FAMILY MEDICINE CLINIC | Facility: CLINIC | Age: 60
End: 2025-03-21
Payer: COMMERCIAL

## 2025-03-21 NOTE — TELEPHONE ENCOUNTER
PT returning Anahy CAMPA's phone call, advised PT that we have placed an endocrinologist referral. PT wanted to know why and I advised her it was for further evaluation for her condition hyperthyroidism. I advised PT I will follow back up on Monday, 3/24/25 once I start working her referral and provide her referral details.

## 2025-04-16 DIAGNOSIS — J44.1 COPD WITH EXACERBATION: ICD-10-CM

## 2025-04-17 RX ORDER — ALBUTEROL SULFATE 90 UG/1
2 INHALANT RESPIRATORY (INHALATION) EVERY 4 HOURS PRN
Qty: 8.5 G | Refills: 2 | Status: SHIPPED | OUTPATIENT
Start: 2025-04-17

## 2025-04-17 NOTE — TELEPHONE ENCOUNTER
Rx Refill Note  Requested Prescriptions     Pending Prescriptions Disp Refills    albuterol sulfate  (90 Base) MCG/ACT inhaler [Pharmacy Med Name: ALBUTEROL HFA INH (200 PUFFS) 8.5GM] 8.5 g      Sig: INHALE 2 PUFFS EVERY 4 HOURS AS NEEDED FOR WHEEZING OR SHORTNESS OF AIR.      Last office visit with prescribing clinician: 10/16/2024   Last telemedicine visit with prescribing clinician: Visit date not found   Next office visit with prescribing clinician: Visit date not found         MARIN Muhammad(R)  04/17/25, 07:39 EDT

## 2025-04-18 ENCOUNTER — OFFICE VISIT (OUTPATIENT)
Dept: ENDOCRINOLOGY | Age: 60
End: 2025-04-18
Payer: COMMERCIAL

## 2025-04-18 VITALS
BODY MASS INDEX: 21.79 KG/M2 | SYSTOLIC BLOOD PRESSURE: 118 MMHG | HEIGHT: 62 IN | DIASTOLIC BLOOD PRESSURE: 78 MMHG | TEMPERATURE: 98.2 F | WEIGHT: 118.4 LBS | HEART RATE: 91 BPM | OXYGEN SATURATION: 90 %

## 2025-04-18 DIAGNOSIS — E03.9 HYPOTHYROIDISM, UNSPECIFIED TYPE: Primary | ICD-10-CM

## 2025-04-18 RX ORDER — LEVOTHYROXINE SODIUM 125 UG/1
125 TABLET ORAL
Qty: 90 TABLET | Refills: 1 | Status: SHIPPED | OUTPATIENT
Start: 2025-04-18

## 2025-04-18 RX ORDER — RIBOFLAVIN (VITAMIN B2) 100 MG
100 TABLET ORAL DAILY
COMMUNITY

## 2025-04-18 RX ORDER — AZITHROMYCIN 250 MG/1
TABLET, FILM COATED ORAL
COMMUNITY
Start: 2025-04-16

## 2025-04-18 NOTE — PROGRESS NOTES
"Chief Complaint  Chief Complaint   Patient presents with    Hypothyroidism     Pt states that energy levels have been low, weight is stable, no family hx of thyroid disease.       Subjective          History of Present Illness    Lyndsay Garcia 59 y.o. presents for an evaluation of Hypothyroidism    Referring provider: MELANIE aClderon    She was diagnosed with thyroid disease around 2010    Family history of thyroid disease: denies  Family history of thyroid cancer: denies        She complains of fatigue (worse), insomnia (no change) and heat intolerance (not change)    Denies weight changes, hair loss, dry skin, diarrhea, constipation, shortness of breath, chest pain, palpitations, cold intolerance, anxiety and tremors      Weight over the past year has bee stable    Natural menopause in 2016      Current treatment is NP Thyroid 90 mg daily  Amour stopped due to cost  Levothyroxine - can't remember why it was stopped      Last labs in 03/25 showed TSH 0.138, FT4 0.93 and FT3 4.3      I have reviewed the patient's allergies, medicines, past medical hx, family hx and social hx.    Objective   Vital Signs:   /78   Pulse 91   Temp 98.2 °F (36.8 °C) (Oral)   Ht 157.5 cm (62.01\")   Wt 53.7 kg (118 lb 6.4 oz)   SpO2 90%   BMI 21.65 kg/m²       Physical Exam   Physical Exam  Constitutional:       General: She is not in acute distress.     Appearance: Normal appearance. She is not diaphoretic.   HENT:      Head: Normocephalic and atraumatic.   Eyes:      General:         Right eye: No discharge.         Left eye: No discharge.   Skin:     General: Skin is warm and dry.   Neurological:      Mental Status: She is alert.   Psychiatric:         Mood and Affect: Mood normal.         Behavior: Behavior normal.                    Results Review:   TSH   Date Value Ref Range Status   03/19/2025 0.138 (L) 0.450 - 4.500 uIU/mL Final     T3, Free   Date Value Ref Range Status   03/19/2025 4.3 2.0 - 4.4 pg/mL Final       "   Assessment and Plan    Diagnoses and all orders for this visit:    1. Hypothyroidism, unspecified type (Primary)  -     levothyroxine (SYNTHROID, LEVOTHROID) 125 MCG tablet; Take 1 tablet by mouth Every Morning. Take on an empty stomach with just water 30 min to an hour before any other medications, food or drink  Dispense: 90 tablet; Refill: 1  -     TSH; Future  -     T4, Free; Future  -     Thyroid Peroxidase Antibody; Future        Pt states that she was diagnosed with thyroid issues about 15 years ago.  She was very tired and when labs were checked it showed hypothyroidism.  Pt stated that she was started on a natural armour thyroid that her provider had at the office.  She was then transitioned to branded Sidney and eventually changed to NP Thyroid due to cost of Sidney.  Pt's thyroid levels have not been stable over the past several years  Stop NP Thyroid  Start levothyroxine 125 mcg daily - Take on an empty stomach with just water 30 min to an hour before any other medications, food or drink  Recheck labs in 6-8 weeks; will check TPO with next labs as well  If labs are stable and pt is still having symptoms then could consider changing to branded.          Labs in 6-8 weeks  RTC in 6 months with me      Follow Up     Patient was given instructions and counseling regarding her condition or for health maintenance advice. Please see specific information pulled into the AVS if appropriate.              MELANIE Monae  04/18/25

## 2025-04-18 NOTE — PATIENT INSTRUCTIONS
Stop NP Thyroid  Start levothyroxine 125 mcg daily - Take on an empty stomach with just water 30 min to an hour before any other medications, food or drink  Recheck labs in 6-8 weeks

## 2025-04-20 DIAGNOSIS — R06.2 WHEEZING: ICD-10-CM

## 2025-04-20 DIAGNOSIS — R06.02 SHORTNESS OF BREATH: ICD-10-CM

## 2025-04-21 RX ORDER — IPRATROPIUM BROMIDE AND ALBUTEROL SULFATE 2.5; .5 MG/3ML; MG/3ML
SOLUTION RESPIRATORY (INHALATION)
Qty: 180 ML | Refills: 3 | Status: SHIPPED | OUTPATIENT
Start: 2025-04-21

## 2025-04-21 NOTE — TELEPHONE ENCOUNTER
Rx Refill Note  Requested Prescriptions     Pending Prescriptions Disp Refills    ipratropium-albuterol (DUO-NEB) 0.5-2.5 mg/3 ml nebulizer [Pharmacy Med Name: IPRATROPI/ALB 0.5/3MG INH SOLUTION] 180 mL      Sig: USE 3 ML VIA NEBULIZER EVERY 4 HOURS AS NEEDED FOR WHEEZING OR SHORTNESS OF BREATH      Last office visit with prescribing clinician: 10/16/2024   Last telemedicine visit with prescribing clinician: Visit date not found   Next office visit with prescribing clinician: Visit date not found       MARIN Muhammad(R)  04/21/25, 10:36 EDT

## 2025-06-06 ENCOUNTER — LAB (OUTPATIENT)
Dept: ENDOCRINOLOGY | Age: 60
End: 2025-06-06
Payer: COMMERCIAL

## 2025-06-06 DIAGNOSIS — E03.9 HYPOTHYROIDISM, UNSPECIFIED TYPE: ICD-10-CM

## 2025-06-07 LAB
T4 FREE SERPL-MCNC: 2.84 NG/DL (ref 0.92–1.68)
THYROPEROXIDASE AB SERPL-ACNC: 69 IU/ML (ref 0–34)
TSH SERPL DL<=0.005 MIU/L-ACNC: <0.005 UIU/ML (ref 0.27–4.2)

## 2025-06-09 ENCOUNTER — RESULTS FOLLOW-UP (OUTPATIENT)
Dept: ENDOCRINOLOGY | Age: 60
End: 2025-06-09
Payer: COMMERCIAL

## 2025-06-09 RX ORDER — LEVOTHYROXINE SODIUM 100 UG/1
100 TABLET ORAL
Qty: 90 TABLET | Refills: 1 | Status: SHIPPED | OUTPATIENT
Start: 2025-06-09

## 2025-07-11 ENCOUNTER — OFFICE VISIT (OUTPATIENT)
Dept: FAMILY MEDICINE CLINIC | Facility: CLINIC | Age: 60
End: 2025-07-11
Payer: COMMERCIAL

## 2025-07-11 VITALS
BODY MASS INDEX: 20.83 KG/M2 | OXYGEN SATURATION: 91 % | HEIGHT: 62 IN | HEART RATE: 94 BPM | SYSTOLIC BLOOD PRESSURE: 110 MMHG | WEIGHT: 113.2 LBS | DIASTOLIC BLOOD PRESSURE: 60 MMHG

## 2025-07-11 DIAGNOSIS — M62.838 MUSCLE SPASMS OF NECK: ICD-10-CM

## 2025-07-11 DIAGNOSIS — J44.1 COPD WITH EXACERBATION: Primary | ICD-10-CM

## 2025-07-11 DIAGNOSIS — J01.00 ACUTE NON-RECURRENT MAXILLARY SINUSITIS: ICD-10-CM

## 2025-07-11 DIAGNOSIS — F17.200 TOBACCO DEPENDENCE: ICD-10-CM

## 2025-07-11 RX ORDER — METHYLPREDNISOLONE SODIUM SUCCINATE 125 MG/2ML
80 INJECTION INTRAMUSCULAR; INTRAVENOUS ONCE
Qty: 0.5 ML | Refills: 0 | Status: SHIPPED | OUTPATIENT
Start: 2025-07-11 | End: 2025-07-11

## 2025-07-11 RX ORDER — METHYLPREDNISOLONE SODIUM SUCCINATE 125 MG/2ML
125 INJECTION, POWDER, LYOPHILIZED, FOR SOLUTION INTRAMUSCULAR; INTRAVENOUS EVERY 6 HOURS
Status: DISCONTINUED | OUTPATIENT
Start: 2025-07-11 | End: 2025-07-11

## 2025-07-11 RX ORDER — DOXYCYCLINE 100 MG/1
100 CAPSULE ORAL 2 TIMES DAILY
Qty: 20 CAPSULE | Refills: 0 | Status: SHIPPED | OUTPATIENT
Start: 2025-07-11

## 2025-07-11 RX ORDER — METHYLPREDNISOLONE SODIUM SUCCINATE 125 MG/2ML
80 INJECTION, POWDER, LYOPHILIZED, FOR SOLUTION INTRAMUSCULAR; INTRAVENOUS ONCE
Status: COMPLETED | OUTPATIENT
Start: 2025-07-11 | End: 2025-07-11

## 2025-07-11 RX ORDER — ALBUTEROL SULFATE 90 UG/1
2 INHALANT RESPIRATORY (INHALATION) EVERY 4 HOURS PRN
Qty: 8.5 G | Refills: 2 | Status: SHIPPED | OUTPATIENT
Start: 2025-07-11

## 2025-07-11 RX ADMIN — METHYLPREDNISOLONE SODIUM SUCCINATE 80 MG: 125 INJECTION, POWDER, LYOPHILIZED, FOR SOLUTION INTRAMUSCULAR; INTRAVENOUS at 11:50

## 2025-07-11 NOTE — PROGRESS NOTES
Chief Complaint  Sinus Problem, COPD, and Spasms    Subjective        Sinus Problem  Associated symptoms include congestion, shortness of breath and sinus pressure. Pertinent negatives include no chills, coughing, ear pain, headaches or sore throat.   COPD  She complains of shortness of breath and wheezing. There is no cough. Pertinent negatives include no chest pain, ear pain, fever, headaches or sore throatEmily Umana [facility] as a 60 year old female C/o c/o a  1-2  week history of sinus pain and pressure, headache, non productive cough, congestion, PND.  Sob with exertion and wheezing-  history of COPD exacerbations.  Has been using nebulizer treatments and azithromycin prescribed by her employer-  no fever, abdominal pain, no N/V/D.  She is able to eat and keep down fluid.    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 Denies exposure to illness        Needs refills on albuterol and tizanidine-  chronic neck pain and muscle spasms.  No changes.  Medication works well for her no side effects      No other acute C/o today    The following portions of the patient's history were reviewed and updated as appropriate: allergies, current medications, past family history, past medical history, past social history, past surgical history, and problem list     Review of Systems   Constitutional:  Negative for chills, fatigue and fever.   HENT:  Positive for congestion and sinus pressure. Negative for ear discharge, ear pain and sore throat.    Eyes:  Negative for visual disturbance.   Respiratory:  Positive for shortness of breath and wheezing. Negative for cough and stridor.    Cardiovascular:  Negative for chest pain, palpitations and leg swelling.   Gastrointestinal:  Negative for abdominal pain, diarrhea, nausea and vomiting.  "  Skin:  Negative for rash.   Neurological:  Negative for dizziness.   Psychiatric/Behavioral:  Negative for self-injury, sleep disturbance and suicidal ideas. The patient is not nervous/anxious.         Objective   Vital Signs:   Vitals:    07/11/25 1111   BP: 110/60   Pulse: 94   SpO2: 91%   Weight: 51.3 kg (113 lb 3.2 oz)   Height: 157.5 cm (62.01\")   PainSc: 0-No pain            7/11/2025    11:12 AM   PHQ-2/PHQ-9 Depression Screening   Little interest or pleasure in doing things Not at all   Feeling down, depressed, or hopeless Not at all   How difficult have these problems made it for you to do your work, take care of things at home, or get along with other people? Not difficult at all       BMI is within normal parameters. No other follow-up for BMI required.        Physical Exam  Vitals reviewed.   Constitutional:       General: She is not in acute distress.  Eyes:      Conjunctiva/sclera: Conjunctivae normal.   Neck:      Thyroid: No thyromegaly.      Vascular: No carotid bruit.   Cardiovascular:      Rate and Rhythm: Normal rate and regular rhythm.      Heart sounds: Normal heart sounds. No murmur heard.  Pulmonary:      Effort: Pulmonary effort is normal. No respiratory distress.      Breath sounds: No stridor. Wheezing present. No rhonchi or rales.   Chest:      Chest wall: No tenderness.   Lymphadenopathy:      Cervical: No cervical adenopathy.   Neurological:      Mental Status: She is alert and oriented to person, place, and time.   Psychiatric:         Attention and Perception: Attention normal.         Mood and Affect: Mood normal.          Result Review :     The following data was reviewed by: MELANIE Gerber on 07/11/2025:           Assessment and Plan       COPD with exacerbation  Take medication as directed  To ER with any sever sob    Orders:    doxycycline (VIBRAMYCIN) 100 MG capsule; Take 1 capsule by mouth 2 (Two) Times a Day.    albuterol sulfate  (90 Base) MCG/ACT inhaler; " Inhale 2 puffs Every 4 (Four) Hours As Needed for Wheezing or Shortness of Air.    methylPREDNISolone sodium succinate (SOLU-Medrol) injection 80 mg    Acute non-recurrent maxillary sinusitis  Doxycycline  was sent to the patient's pharmacy   - Flonase nasal spray as needed  -Take all medications as prescribed and until completed.  -Monitor for fever and take Tylenol as needed.  Drink plenty of fluids and get plenty of rest.  -Use cool-mist humidifier  -Seek immediate medical attention for fever unrelieved by Tylenol, chest pain, shortness of breath, sharp back pain, or any other worsening signs or symptoms.  -The patient verbalized understanding of all instructions given today.    Orders:    doxycycline (VIBRAMYCIN) 100 MG capsule; Take 1 capsule by mouth 2 (Two) Times a Day.    Muscle spasms of neck  Avoid alcohol or driving when taking this medication  Orders:    tiZANidine (ZANAFLEX) 4 MG tablet; Take 1 tablet by mouth At Night As Needed for Muscle Spasms.    Tobacco dependence  Recommend cessation                     To ER with any severe shortness of breath or wheezing      Follow Up   Return if symptoms worsen or fail to improve, for Follow up with PCP as Directed.  Patient was given instructions and counseling regarding her condition or for health maintenance advice. Please see specific information pulled into the AVS if appropriate.

## 2025-07-11 NOTE — ASSESSMENT & PLAN NOTE
Avoid alcohol or driving when taking this medication  Orders:    tiZANidine (ZANAFLEX) 4 MG tablet; Take 1 tablet by mouth At Night As Needed for Muscle Spasms.

## 2025-07-11 NOTE — ASSESSMENT & PLAN NOTE
Take medication as directed  To ER with any sever sob    Orders:    doxycycline (VIBRAMYCIN) 100 MG capsule; Take 1 capsule by mouth 2 (Two) Times a Day.    albuterol sulfate  (90 Base) MCG/ACT inhaler; Inhale 2 puffs Every 4 (Four) Hours As Needed for Wheezing or Shortness of Air.    methylPREDNISolone sodium succinate (SOLU-Medrol) injection 80 mg

## 2025-07-14 ENCOUNTER — TELEPHONE (OUTPATIENT)
Dept: ENDOCRINOLOGY | Age: 60
End: 2025-07-14

## 2025-07-14 DIAGNOSIS — E03.9 HYPOTHYROIDISM, UNSPECIFIED TYPE: Primary | ICD-10-CM

## 2025-08-04 ENCOUNTER — RESULTS FOLLOW-UP (OUTPATIENT)
Dept: ENDOCRINOLOGY | Age: 60
End: 2025-08-04
Payer: COMMERCIAL

## 2025-08-04 DIAGNOSIS — E06.3 HYPOTHYROIDISM DUE TO HASHIMOTO THYROIDITIS: Primary | ICD-10-CM

## 2025-08-04 RX ORDER — LEVOTHYROXINE SODIUM 75 UG/1
75 TABLET ORAL
Qty: 90 TABLET | Refills: 1 | Status: SHIPPED | OUTPATIENT
Start: 2025-08-04